# Patient Record
Sex: MALE | Race: WHITE | URBAN - METROPOLITAN AREA
[De-identification: names, ages, dates, MRNs, and addresses within clinical notes are randomized per-mention and may not be internally consistent; named-entity substitution may affect disease eponyms.]

---

## 2017-02-17 ENCOUNTER — HISTORICAL (OUTPATIENT)
Dept: ADMINISTRATIVE | Facility: HOSPITAL | Age: 62
End: 2017-02-17

## 2017-02-21 ENCOUNTER — HISTORICAL (OUTPATIENT)
Dept: ADMINISTRATIVE | Facility: HOSPITAL | Age: 62
End: 2017-02-21

## 2017-04-12 ENCOUNTER — HISTORICAL (OUTPATIENT)
Dept: ADMINISTRATIVE | Facility: HOSPITAL | Age: 62
End: 2017-04-12

## 2017-05-15 ENCOUNTER — HISTORICAL (OUTPATIENT)
Dept: ADMINISTRATIVE | Facility: HOSPITAL | Age: 62
End: 2017-05-15

## 2017-05-15 LAB
ABS NEUT (OLG): 6.95 X10(3)/MCL (ref 2.1–9.2)
BASOPHILS # BLD AUTO: 0.07 X10(3)/MCL
BASOPHILS NFR BLD AUTO: 1 % (ref 0–1)
BUN SERPL-MCNC: 13 MG/DL (ref 7–25)
CALCIUM SERPL-MCNC: 8 MG/DL (ref 8.4–10.3)
CHLORIDE SERPL-SCNC: 111 MMOL/L (ref 96–110)
CO2 SERPL-SCNC: 23 MMOL/L (ref 24–32)
CREAT SERPL-MCNC: 0.74 MG/DL (ref 0.7–1.4)
EOSINOPHIL # BLD AUTO: 0.21 X10(3)/MCL
EOSINOPHIL NFR BLD AUTO: 2 % (ref 0–5)
ERYTHROCYTE [DISTWIDTH] IN BLOOD BY AUTOMATED COUNT: 13.6 % (ref 11.5–14.5)
GLUCOSE SERPL-MCNC: 107 MG/DL (ref 65–99)
HCT VFR BLD AUTO: 36.6 % (ref 40–51)
HGB BLD-MCNC: 12.5 GM/DL (ref 13.5–17.5)
IMM GRANULOCYTES # BLD AUTO: 0.06 10*3/UL
IMM GRANULOCYTES NFR BLD AUTO: 0 %
LYMPHOCYTES # BLD AUTO: 3.14 X10(3)/MCL
LYMPHOCYTES NFR BLD AUTO: 27 % (ref 15–40)
MCH RBC QN AUTO: 33.2 PG (ref 26–34)
MCHC RBC AUTO-ENTMCNC: 34.2 GM/DL (ref 31–37)
MCV RBC AUTO: 97.1 FL (ref 80–100)
MONOCYTES # BLD AUTO: 1.04 X10(3)/MCL
MONOCYTES NFR BLD AUTO: 9 % (ref 4–12)
NEUTROPHILS # BLD AUTO: 6.95 X10(3)/MCL
NEUTROPHILS NFR BLD AUTO: 61 X10(3)/MCL
PLATELET # BLD AUTO: 206 X10(3)/MCL (ref 130–400)
PMV BLD AUTO: 8.8 FL (ref 7.4–10.4)
POTASSIUM SERPL-SCNC: 3.8 MMOL/L (ref 3.6–5.2)
RBC # BLD AUTO: 3.77 X10(6)/MCL (ref 4.5–5.9)
SODIUM SERPL-SCNC: 140 MMOL/L (ref 135–146)
WBC # SPEC AUTO: 11.5 X10(3)/MCL (ref 4.5–11)

## 2017-05-16 LAB
ABS NEUT (OLG): 6.12 X10(3)/MCL (ref 2.1–9.2)
BASOPHILS # BLD AUTO: 0.03 X10(3)/MCL
BASOPHILS NFR BLD AUTO: 0 % (ref 0–1)
BUN SERPL-MCNC: 10 MG/DL (ref 7–25)
CALCIUM SERPL-MCNC: 8.7 MG/DL (ref 8.4–10.3)
CHLORIDE SERPL-SCNC: 105 MMOL/L (ref 96–110)
CO2 SERPL-SCNC: 27 MMOL/L (ref 24–32)
CREAT SERPL-MCNC: 0.84 MG/DL (ref 0.7–1.4)
EOSINOPHIL # BLD AUTO: 0.12 10*3/UL
EOSINOPHIL NFR BLD AUTO: 1 % (ref 0–5)
ERYTHROCYTE [DISTWIDTH] IN BLOOD BY AUTOMATED COUNT: 13.9 % (ref 11.5–14.5)
GLUCOSE SERPL-MCNC: 96 MG/DL (ref 65–99)
HCT VFR BLD AUTO: 35.4 % (ref 40–51)
HGB BLD-MCNC: 11.9 GM/DL (ref 13.5–17.5)
IMM GRANULOCYTES # BLD AUTO: 0.03 10*3/UL
IMM GRANULOCYTES NFR BLD AUTO: 0 %
LYMPHOCYTES # BLD AUTO: 2.25 X10(3)/MCL
LYMPHOCYTES NFR BLD AUTO: 24 % (ref 15–40)
MCH RBC QN AUTO: 33.6 PG (ref 26–34)
MCHC RBC AUTO-ENTMCNC: 33.6 GM/DL (ref 31–37)
MCV RBC AUTO: 100 FL (ref 80–100)
MONOCYTES # BLD AUTO: 0.91 X10(3)/MCL
MONOCYTES NFR BLD AUTO: 10 % (ref 4–12)
NEUTROPHILS # BLD AUTO: 6.12 X10(3)/MCL
NEUTROPHILS NFR BLD AUTO: 65 X10(3)/MCL
PLATELET # BLD AUTO: 191 X10(3)/MCL (ref 130–400)
PMV BLD AUTO: 9.8 FL (ref 7.4–10.4)
POTASSIUM SERPL-SCNC: 4.4 MMOL/L (ref 3.6–5.2)
RBC # BLD AUTO: 3.54 X10(6)/MCL (ref 4.5–5.9)
SODIUM SERPL-SCNC: 141 MMOL/L (ref 135–146)
WBC # SPEC AUTO: 9.5 X10(3)/MCL (ref 4.5–11)

## 2017-05-18 ENCOUNTER — HISTORICAL (OUTPATIENT)
Dept: ADMINISTRATIVE | Facility: HOSPITAL | Age: 62
End: 2017-05-18

## 2017-06-14 ENCOUNTER — HISTORICAL (OUTPATIENT)
Dept: ADMINISTRATIVE | Facility: HOSPITAL | Age: 62
End: 2017-06-14

## 2017-09-20 ENCOUNTER — HISTORICAL (OUTPATIENT)
Dept: ADMINISTRATIVE | Facility: HOSPITAL | Age: 62
End: 2017-09-20

## 2018-03-19 ENCOUNTER — HISTORICAL (OUTPATIENT)
Dept: ADMINISTRATIVE | Facility: HOSPITAL | Age: 63
End: 2018-03-19

## 2018-03-19 LAB
ABS NEUT (OLG): 1.8 X10(3)/MCL (ref 2.1–9.2)
ALBUMIN SERPL-MCNC: 3.6 GM/DL (ref 3.4–5)
ALBUMIN/GLOB SERPL: 1 RATIO (ref 1–2)
ALP SERPL-CCNC: 71 UNIT/L (ref 45–117)
ALT SERPL-CCNC: 23 UNIT/L (ref 12–78)
AST SERPL-CCNC: 28 UNIT/L (ref 15–37)
BASOPHILS # BLD AUTO: 0.06 X10(3)/MCL
BASOPHILS NFR BLD AUTO: 1 %
BILIRUB SERPL-MCNC: 0.5 MG/DL (ref 0.2–1)
BILIRUBIN DIRECT+TOT PNL SERPL-MCNC: 0.2 MG/DL
BILIRUBIN DIRECT+TOT PNL SERPL-MCNC: 0.3 MG/DL
BUN SERPL-MCNC: 9 MG/DL (ref 7–18)
CALCIUM SERPL-MCNC: 9 MG/DL (ref 8.5–10.1)
CHLORIDE SERPL-SCNC: 106 MMOL/L (ref 98–107)
CHOLEST SERPL-MCNC: 142 MG/DL
CHOLEST/HDLC SERPL: 2.3 {RATIO} (ref 0–5)
CO2 SERPL-SCNC: 30 MMOL/L (ref 21–32)
CREAT SERPL-MCNC: 0.8 MG/DL (ref 0.6–1.3)
EOSINOPHIL # BLD AUTO: 0.29 10*3/UL
EOSINOPHIL NFR BLD AUTO: 6 %
ERYTHROCYTE [DISTWIDTH] IN BLOOD BY AUTOMATED COUNT: 13.2 % (ref 11.5–14.5)
GLOBULIN SER-MCNC: 3.8 GM/ML (ref 2.3–3.5)
GLUCOSE SERPL-MCNC: 76 MG/DL (ref 74–106)
HCT VFR BLD AUTO: 43.3 % (ref 40–51)
HDLC SERPL-MCNC: 61 MG/DL
HGB BLD-MCNC: 14.9 GM/DL (ref 13.5–17.5)
IMM GRANULOCYTES # BLD AUTO: 0.02 10*3/UL
IMM GRANULOCYTES NFR BLD AUTO: 0 %
LDLC SERPL CALC-MCNC: 58 MG/DL (ref 0–130)
LYMPHOCYTES # BLD AUTO: 2.02 X10(3)/MCL
LYMPHOCYTES NFR BLD AUTO: 42 % (ref 13–40)
MCH RBC QN AUTO: 34.3 PG (ref 26–34)
MCHC RBC AUTO-ENTMCNC: 34.4 GM/DL (ref 31–37)
MCV RBC AUTO: 99.5 FL (ref 80–100)
MONOCYTES # BLD AUTO: 0.63 X10(3)/MCL
MONOCYTES NFR BLD AUTO: 13 % (ref 4–12)
NEUTROPHILS # BLD AUTO: 1.8 X10(3)/MCL
NEUTROPHILS NFR BLD AUTO: 37 X10(3)/MCL
PLATELET # BLD AUTO: 259 X10(3)/MCL (ref 130–400)
PMV BLD AUTO: 8.9 FL (ref 7.4–10.4)
POTASSIUM SERPL-SCNC: 5 MMOL/L (ref 3.5–5.1)
PROT SERPL-MCNC: 7.4 GM/DL (ref 6.4–8.2)
RBC # BLD AUTO: 4.35 X10(6)/MCL (ref 4.5–5.9)
SODIUM SERPL-SCNC: 142 MMOL/L (ref 136–145)
T4 FREE SERPL-MCNC: 0.88 NG/DL (ref 0.76–1.46)
TRIGL SERPL-MCNC: 114 MG/DL
TSH SERPL-ACNC: 1.07 MIU/L (ref 0.36–3.74)
VLDLC SERPL CALC-MCNC: 23 MG/DL
WBC # SPEC AUTO: 4.8 X10(3)/MCL (ref 4.5–11)

## 2018-04-30 ENCOUNTER — HISTORICAL (OUTPATIENT)
Dept: ADMINISTRATIVE | Facility: HOSPITAL | Age: 63
End: 2018-04-30

## 2018-04-30 LAB
ABS NEUT (OLG): 4.06 X10(3)/MCL (ref 2.1–9.2)
ALBUMIN SERPL-MCNC: 3.8 GM/DL (ref 3.4–5)
ALBUMIN/GLOB SERPL: 1 RATIO (ref 1–2)
ALP SERPL-CCNC: 74 UNIT/L (ref 45–117)
ALT SERPL-CCNC: 20 UNIT/L (ref 12–78)
AST SERPL-CCNC: 29 UNIT/L (ref 15–37)
BASOPHILS # BLD AUTO: 0.04 X10(3)/MCL
BASOPHILS NFR BLD AUTO: 1 %
BILIRUB SERPL-MCNC: 1.1 MG/DL (ref 0.2–1)
BILIRUBIN DIRECT+TOT PNL SERPL-MCNC: 0.4 MG/DL
BILIRUBIN DIRECT+TOT PNL SERPL-MCNC: 0.7 MG/DL
BUN SERPL-MCNC: 11 MG/DL (ref 7–18)
CALCIUM SERPL-MCNC: 8.7 MG/DL (ref 8.5–10.1)
CHLORIDE SERPL-SCNC: 105 MMOL/L (ref 98–107)
CK MB SERPL-MCNC: 1.2 NG/ML (ref 1–3.6)
CK SERPL-CCNC: 112 UNIT/L (ref 39–308)
CO2 SERPL-SCNC: 27 MMOL/L (ref 21–32)
CREAT SERPL-MCNC: 0.8 MG/DL (ref 0.6–1.3)
EOSINOPHIL # BLD AUTO: 0.08 10*3/UL
EOSINOPHIL NFR BLD AUTO: 1 %
ERYTHROCYTE [DISTWIDTH] IN BLOOD BY AUTOMATED COUNT: 13.3 % (ref 11.5–14.5)
GLOBULIN SER-MCNC: 3.8 GM/ML (ref 2.3–3.5)
GLUCOSE SERPL-MCNC: 83 MG/DL (ref 74–106)
HCT VFR BLD AUTO: 44.9 % (ref 40–51)
HGB BLD-MCNC: 15.8 GM/DL (ref 13.5–17.5)
IMM GRANULOCYTES # BLD AUTO: 0.02 10*3/UL
IMM GRANULOCYTES NFR BLD AUTO: 0 %
LYMPHOCYTES # BLD AUTO: 1.59 X10(3)/MCL
LYMPHOCYTES NFR BLD AUTO: 24 % (ref 13–40)
MAGNESIUM SERPL-MCNC: 2.4 MG/DL (ref 1.8–2.4)
MCH RBC QN AUTO: 34.5 PG (ref 26–34)
MCHC RBC AUTO-ENTMCNC: 35.2 GM/DL (ref 31–37)
MCV RBC AUTO: 98 FL (ref 80–100)
MONOCYTES # BLD AUTO: 0.78 X10(3)/MCL
MONOCYTES NFR BLD AUTO: 12 % (ref 4–12)
NEUTROPHILS # BLD AUTO: 4.06 X10(3)/MCL
NEUTROPHILS NFR BLD AUTO: 62 X10(3)/MCL
PLATELET # BLD AUTO: 245 X10(3)/MCL (ref 130–400)
PMV BLD AUTO: 9 FL (ref 7.4–10.4)
POC TROPONIN: 0 NG/ML (ref 0–0.08)
POTASSIUM SERPL-SCNC: 4.4 MMOL/L (ref 3.5–5.1)
PROT SERPL-MCNC: 7.6 GM/DL (ref 6.4–8.2)
RBC # BLD AUTO: 4.58 X10(6)/MCL (ref 4.5–5.9)
SODIUM SERPL-SCNC: 133 MMOL/L (ref 136–145)
WBC # SPEC AUTO: 6.6 X10(3)/MCL (ref 4.5–11)

## 2018-07-31 ENCOUNTER — HISTORICAL (OUTPATIENT)
Dept: ADMINISTRATIVE | Facility: HOSPITAL | Age: 63
End: 2018-07-31

## 2018-08-09 ENCOUNTER — HISTORICAL (OUTPATIENT)
Dept: ADMINISTRATIVE | Facility: HOSPITAL | Age: 63
End: 2018-08-09

## 2018-10-22 ENCOUNTER — HISTORICAL (OUTPATIENT)
Dept: ADMINISTRATIVE | Facility: HOSPITAL | Age: 63
End: 2018-10-22

## 2018-10-22 LAB
ALBUMIN SERPL-MCNC: 3.6 GM/DL (ref 3.4–5)
ALBUMIN/GLOB SERPL: 1 RATIO (ref 1–2)
ALP SERPL-CCNC: 67 UNIT/L (ref 45–117)
ALT SERPL-CCNC: 21 UNIT/L (ref 12–78)
AST SERPL-CCNC: 25 UNIT/L (ref 15–37)
BILIRUB SERPL-MCNC: 0.6 MG/DL (ref 0.2–1)
BILIRUBIN DIRECT+TOT PNL SERPL-MCNC: 0.2 MG/DL
BILIRUBIN DIRECT+TOT PNL SERPL-MCNC: 0.4 MG/DL
BUN SERPL-MCNC: 11 MG/DL (ref 7–18)
CALCIUM SERPL-MCNC: 9.1 MG/DL (ref 8.5–10.1)
CHLORIDE SERPL-SCNC: 105 MMOL/L (ref 98–107)
CHOLEST SERPL-MCNC: 132 MG/DL
CHOLEST/HDLC SERPL: 2.3 {RATIO} (ref 0–5)
CO2 SERPL-SCNC: 32 MMOL/L (ref 21–32)
CREAT SERPL-MCNC: 1 MG/DL (ref 0.6–1.3)
GLOBULIN SER-MCNC: 4 GM/ML (ref 2.3–3.5)
GLUCOSE SERPL-MCNC: 92 MG/DL (ref 74–106)
HDLC SERPL-MCNC: 58 MG/DL
LDLC SERPL CALC-MCNC: 46 MG/DL (ref 0–130)
POTASSIUM SERPL-SCNC: 4.2 MMOL/L (ref 3.5–5.1)
PROT SERPL-MCNC: 7.6 GM/DL (ref 6.4–8.2)
SODIUM SERPL-SCNC: 141 MMOL/L (ref 136–145)
TRIGL SERPL-MCNC: 139 MG/DL
VLDLC SERPL CALC-MCNC: 28 MG/DL

## 2018-11-26 ENCOUNTER — HISTORICAL (OUTPATIENT)
Dept: ADMINISTRATIVE | Facility: HOSPITAL | Age: 63
End: 2018-11-26

## 2019-05-06 ENCOUNTER — HISTORICAL (OUTPATIENT)
Dept: ADMINISTRATIVE | Facility: HOSPITAL | Age: 64
End: 2019-05-06

## 2019-05-06 LAB
ABS NEUT (OLG): 4.24 X10(3)/MCL (ref 2.1–9.2)
ALBUMIN SERPL-MCNC: 3.6 GM/DL (ref 3.4–5)
ALBUMIN/GLOB SERPL: 0.9 RATIO (ref 1.1–2)
ALP SERPL-CCNC: 80 UNIT/L (ref 45–117)
ALT SERPL-CCNC: 17 UNIT/L (ref 12–78)
APPEARANCE, UA: CLEAR
AST SERPL-CCNC: 25 UNIT/L (ref 15–37)
BACTERIA #/AREA URNS AUTO: ABNORMAL /[HPF]
BASOPHILS # BLD AUTO: 0.07 X10(3)/MCL
BASOPHILS NFR BLD AUTO: 1 %
BILIRUB SERPL-MCNC: 0.8 MG/DL (ref 0.2–1)
BILIRUB UR QL STRIP: NEGATIVE
BILIRUBIN DIRECT+TOT PNL SERPL-MCNC: 0.2 MG/DL
BILIRUBIN DIRECT+TOT PNL SERPL-MCNC: 0.6 MG/DL
BUN SERPL-MCNC: 10 MG/DL (ref 7–18)
CALCIUM SERPL-MCNC: 9.2 MG/DL (ref 8.5–10.1)
CHLORIDE SERPL-SCNC: 107 MMOL/L (ref 98–107)
CHOLEST SERPL-MCNC: 142 MG/DL
CHOLEST/HDLC SERPL: 2.7 {RATIO} (ref 0–5)
CO2 SERPL-SCNC: 31 MMOL/L (ref 21–32)
COLOR UR: YELLOW
CREAT SERPL-MCNC: 1 MG/DL (ref 0.6–1.3)
CREAT UR-MCNC: 143 MG/DL
EOSINOPHIL # BLD AUTO: 0.21 X10(3)/MCL
EOSINOPHIL NFR BLD AUTO: 3 %
ERYTHROCYTE [DISTWIDTH] IN BLOOD BY AUTOMATED COUNT: 13.3 % (ref 11.5–14.5)
EST. AVERAGE GLUCOSE BLD GHB EST-MCNC: 108 MG/DL
GLOBULIN SER-MCNC: 3.9 GM/ML (ref 2.3–3.5)
GLUCOSE (UA): NORMAL
GLUCOSE SERPL-MCNC: 82 MG/DL (ref 74–106)
HAV IGM SERPL QL IA: NONREACTIVE
HBA1C MFR BLD: 5.4 % (ref 4.2–6.3)
HBV CORE IGM SERPL QL IA: NONREACTIVE
HBV SURFACE AG SERPL QL IA: NEGATIVE
HCT VFR BLD AUTO: 45.7 % (ref 40–51)
HCV AB SERPL QL IA: NONREACTIVE
HDLC SERPL-MCNC: 53 MG/DL
HGB BLD-MCNC: 15.5 GM/DL (ref 13.5–17.5)
HGB UR QL STRIP: NEGATIVE
HIV 1+2 AB+HIV1 P24 AG SERPL QL IA: NONREACTIVE
HYALINE CASTS #/AREA URNS LPF: ABNORMAL /[LPF]
IMM GRANULOCYTES # BLD AUTO: 0.03 10*3/UL
IMM GRANULOCYTES NFR BLD AUTO: 0 %
KETONES UR QL STRIP: NEGATIVE
LDLC SERPL CALC-MCNC: 52 MG/DL (ref 0–130)
LEUKOCYTE ESTERASE UR QL STRIP: NEGATIVE
LYMPHOCYTES # BLD AUTO: 2.63 X10(3)/MCL
LYMPHOCYTES NFR BLD AUTO: 32 % (ref 13–40)
MCH RBC QN AUTO: 33.7 PG (ref 26–34)
MCHC RBC AUTO-ENTMCNC: 33.9 GM/DL (ref 31–37)
MCV RBC AUTO: 99.3 FL (ref 80–100)
MICROALBUMIN UR-MCNC: 11.9 MG/L (ref 0–19)
MICROALBUMIN/CREAT RATIO PNL UR: 8.3 MCG/MG CR (ref 0–29)
MONOCYTES # BLD AUTO: 1.03 X10(3)/MCL
MONOCYTES NFR BLD AUTO: 12 % (ref 4–12)
NEUTROPHILS # BLD AUTO: 4.24 X10(3)/MCL
NEUTROPHILS NFR BLD AUTO: 52 X10(3)/MCL
NITRITE UR QL STRIP: NEGATIVE
PH UR STRIP: 7 [PH] (ref 4.5–8)
PLATELET # BLD AUTO: 270 X10(3)/MCL (ref 130–400)
PMV BLD AUTO: 9.2 FL (ref 7.4–10.4)
POTASSIUM SERPL-SCNC: 4.6 MMOL/L (ref 3.5–5.1)
PROT SERPL-MCNC: 7.5 GM/DL (ref 6.4–8.2)
PROT UR QL STRIP: NEGATIVE
PSA SERPL-MCNC: 0.3 NG/ML
RBC # BLD AUTO: 4.6 X10(6)/MCL (ref 4.5–5.9)
RBC #/AREA URNS AUTO: ABNORMAL /[HPF]
SODIUM SERPL-SCNC: 141 MMOL/L (ref 136–145)
SP GR UR STRIP: 1.01 (ref 1–1.03)
SQUAMOUS #/AREA URNS LPF: ABNORMAL /[LPF]
TRIGL SERPL-MCNC: 186 MG/DL
TSH SERPL-ACNC: 1.95 MIU/L (ref 0.36–3.74)
UROBILINOGEN UR STRIP-ACNC: NORMAL
VLDLC SERPL CALC-MCNC: 37 MG/DL
WBC # SPEC AUTO: 8.2 X10(3)/MCL (ref 4.5–11)
WBC #/AREA URNS AUTO: ABNORMAL /HPF

## 2019-06-21 ENCOUNTER — HISTORICAL (OUTPATIENT)
Dept: ADMINISTRATIVE | Facility: HOSPITAL | Age: 64
End: 2019-06-21

## 2019-06-21 LAB
BUN SERPL-MCNC: 8 MG/DL (ref 7–18)
CALCIUM SERPL-MCNC: 9.4 MG/DL (ref 8.5–10.1)
CHLORIDE SERPL-SCNC: 107 MMOL/L (ref 98–107)
CO2 SERPL-SCNC: 29 MMOL/L (ref 21–32)
CREAT SERPL-MCNC: 0.8 MG/DL (ref 0.6–1.3)
CREAT/UREA NIT SERPL: 10
ERYTHROCYTE [DISTWIDTH] IN BLOOD BY AUTOMATED COUNT: 13.3 % (ref 11.5–14.5)
GLUCOSE SERPL-MCNC: 73 MG/DL (ref 74–106)
HCT VFR BLD AUTO: 44.3 % (ref 40–51)
HGB BLD-MCNC: 14.9 GM/DL (ref 13.5–17.5)
MCH RBC QN AUTO: 33.7 PG (ref 26–34)
MCHC RBC AUTO-ENTMCNC: 33.6 GM/DL (ref 31–37)
MCV RBC AUTO: 100.2 FL (ref 80–100)
PLATELET # BLD AUTO: 253 X10(3)/MCL (ref 130–400)
PMV BLD AUTO: 9.6 FL (ref 7.4–10.4)
POTASSIUM SERPL-SCNC: 4.4 MMOL/L (ref 3.5–5.1)
RBC # BLD AUTO: 4.42 X10(6)/MCL (ref 4.5–5.9)
SODIUM SERPL-SCNC: 139 MMOL/L (ref 136–145)
WBC # SPEC AUTO: 6.8 X10(3)/MCL (ref 4.5–11)

## 2019-06-28 ENCOUNTER — HISTORICAL (OUTPATIENT)
Dept: ADMINISTRATIVE | Facility: HOSPITAL | Age: 64
End: 2019-06-28

## 2019-06-28 LAB — ETHANOL SERPL-MCNC: 3 MG/DL

## 2019-09-13 ENCOUNTER — HISTORICAL (OUTPATIENT)
Dept: ADMINISTRATIVE | Facility: HOSPITAL | Age: 64
End: 2019-09-13

## 2019-09-13 LAB
ABS NEUT (OLG): 7.39 X10(3)/MCL (ref 2.1–9.2)
ALBUMIN SERPL-MCNC: 3.7 GM/DL (ref 3.4–5)
ALBUMIN/GLOB SERPL: 1 RATIO (ref 1.1–2)
ALP SERPL-CCNC: 80 UNIT/L (ref 45–117)
ALT SERPL-CCNC: 14 UNIT/L (ref 12–78)
APTT PPP: 35.1 SECOND(S) (ref 23.3–37)
AST SERPL-CCNC: 19 UNIT/L (ref 15–37)
BASOPHILS # BLD AUTO: 0 X10(3)/MCL (ref 0–0.2)
BASOPHILS NFR BLD AUTO: 0 %
BILIRUB SERPL-MCNC: 1.1 MG/DL (ref 0.2–1)
BILIRUBIN DIRECT+TOT PNL SERPL-MCNC: 0.4 MG/DL (ref 0–0.2)
BILIRUBIN DIRECT+TOT PNL SERPL-MCNC: 0.7 MG/DL
BUN SERPL-MCNC: 13 MG/DL (ref 7–18)
CALCIUM SERPL-MCNC: 9.2 MG/DL (ref 8.5–10.1)
CHLORIDE SERPL-SCNC: 105 MMOL/L (ref 98–107)
CO2 SERPL-SCNC: 28 MMOL/L (ref 21–32)
CREAT SERPL-MCNC: 0.9 MG/DL (ref 0.6–1.3)
EOSINOPHIL # BLD AUTO: 0 X10(3)/MCL (ref 0–0.9)
EOSINOPHIL NFR BLD AUTO: 0 %
ERYTHROCYTE [DISTWIDTH] IN BLOOD BY AUTOMATED COUNT: 14.3 % (ref 11.5–14.5)
GLOBULIN SER-MCNC: 3.7 GM/ML (ref 2.3–3.5)
GLUCOSE SERPL-MCNC: 124 MG/DL (ref 74–106)
HCT VFR BLD AUTO: 43.7 % (ref 40–51)
HGB BLD-MCNC: 14.2 GM/DL (ref 13.5–17.5)
IMM GRANULOCYTES # BLD AUTO: 0.04 10*3/UL
IMM GRANULOCYTES NFR BLD AUTO: 0 %
INR PPP: 2.49 (ref 0.9–1.2)
LYMPHOCYTES # BLD AUTO: 1.5 X10(3)/MCL (ref 0.6–4.6)
LYMPHOCYTES NFR BLD AUTO: 16 %
MCH RBC QN AUTO: 33.1 PG (ref 26–34)
MCHC RBC AUTO-ENTMCNC: 32.5 GM/DL (ref 31–37)
MCV RBC AUTO: 101.9 FL (ref 80–100)
MONOCYTES # BLD AUTO: 0.8 X10(3)/MCL (ref 0.1–1.3)
MONOCYTES NFR BLD AUTO: 8 %
NEUTROPHILS # BLD AUTO: 7.39 X10(3)/MCL (ref 2.1–9.2)
NEUTROPHILS NFR BLD AUTO: 75 %
PLATELET # BLD AUTO: 273 X10(3)/MCL (ref 130–400)
PMV BLD AUTO: 8.8 FL (ref 7.4–10.4)
POTASSIUM SERPL-SCNC: 4.5 MMOL/L (ref 3.5–5.1)
PROT SERPL-MCNC: 7.4 GM/DL (ref 6.4–8.2)
PROTHROMBIN TIME: 27 SECOND(S) (ref 11.9–14.4)
RBC # BLD AUTO: 4.29 X10(6)/MCL (ref 4.5–5.9)
SODIUM SERPL-SCNC: 139 MMOL/L (ref 136–145)
TROPONIN I SERPL-MCNC: <0.015 NG/ML (ref 0–0.05)
WBC # SPEC AUTO: 9.8 X10(3)/MCL (ref 4.5–11)

## 2019-11-01 ENCOUNTER — HISTORICAL (OUTPATIENT)
Dept: ADMINISTRATIVE | Facility: HOSPITAL | Age: 64
End: 2019-11-01

## 2019-11-01 LAB
CHOLEST SERPL-MCNC: 139 MG/DL
CHOLEST/HDLC SERPL: 2.4 {RATIO} (ref 0–5)
DEPRECATED CALCIDIOL+CALCIFEROL SERPL-MC: 31.7 NG/ML (ref 30–80)
HDLC SERPL-MCNC: 57 MG/DL (ref 40–59)
LDLC SERPL CALC-MCNC: 57 MG/DL
TRIGL SERPL-MCNC: 124 MG/DL
VLDLC SERPL CALC-MCNC: 25 MG/DL

## 2020-05-04 ENCOUNTER — HISTORICAL (OUTPATIENT)
Dept: ADMINISTRATIVE | Facility: HOSPITAL | Age: 65
End: 2020-05-04

## 2020-05-04 LAB
ABS NEUT (OLG): 3.88 X10(3)/MCL (ref 2.1–9.2)
ALBUMIN SERPL-MCNC: 3.7 GM/DL (ref 3.4–5)
ALBUMIN/GLOB SERPL: 0.9 RATIO (ref 1.1–2)
ALP SERPL-CCNC: 87 UNIT/L (ref 45–117)
ALT SERPL-CCNC: 16 UNIT/L (ref 12–78)
AST SERPL-CCNC: 26 UNIT/L (ref 15–37)
BASOPHILS # BLD AUTO: 0.1 X10(3)/MCL (ref 0–0.2)
BASOPHILS NFR BLD AUTO: 1 %
BILIRUB SERPL-MCNC: 0.8 MG/DL (ref 0.2–1)
BILIRUBIN DIRECT+TOT PNL SERPL-MCNC: 0.2 MG/DL (ref 0–0.2)
BILIRUBIN DIRECT+TOT PNL SERPL-MCNC: 0.6 MG/DL
BUN SERPL-MCNC: 8 MG/DL (ref 7–18)
CALCIUM SERPL-MCNC: 9.2 MG/DL (ref 8.5–10.1)
CHLORIDE SERPL-SCNC: 110 MMOL/L (ref 98–107)
CHOLEST SERPL-MCNC: 139 MG/DL
CHOLEST/HDLC SERPL: 2.3 {RATIO} (ref 0–5)
CO2 SERPL-SCNC: 25 MMOL/L (ref 21–32)
CREAT SERPL-MCNC: 0.8 MG/DL (ref 0.6–1.3)
EOSINOPHIL # BLD AUTO: 0.1 X10(3)/MCL (ref 0–0.9)
EOSINOPHIL NFR BLD AUTO: 2 %
ERYTHROCYTE [DISTWIDTH] IN BLOOD BY AUTOMATED COUNT: 17.4 % (ref 11.5–14.5)
EST. AVERAGE GLUCOSE BLD GHB EST-MCNC: 148 MG/DL
GLOBULIN SER-MCNC: 4.2 GM/ML (ref 2.3–3.5)
GLUCOSE SERPL-MCNC: 79 MG/DL (ref 74–106)
HBA1C MFR BLD: 6.8 % (ref 4.2–6.3)
HCT VFR BLD AUTO: 35.7 % (ref 40–51)
HDLC SERPL-MCNC: 60 MG/DL (ref 40–59)
HGB BLD-MCNC: 11.3 GM/DL (ref 13.5–17.5)
IMM GRANULOCYTES # BLD AUTO: 0.03 10*3/UL
IMM GRANULOCYTES NFR BLD AUTO: 0 %
LDLC SERPL CALC-MCNC: 59 MG/DL
LYMPHOCYTES # BLD AUTO: 1.8 X10(3)/MCL (ref 0.6–4.6)
LYMPHOCYTES NFR BLD AUTO: 27 %
MCH RBC QN AUTO: 28.3 PG (ref 26–34)
MCHC RBC AUTO-ENTMCNC: 31.7 GM/DL (ref 31–37)
MCV RBC AUTO: 89.5 FL (ref 80–100)
MONOCYTES # BLD AUTO: 0.8 X10(3)/MCL (ref 0.1–1.3)
MONOCYTES NFR BLD AUTO: 12 %
NEUTROPHILS # BLD AUTO: 3.88 X10(3)/MCL (ref 2.1–9.2)
NEUTROPHILS NFR BLD AUTO: 58 %
PLATELET # BLD AUTO: 343 X10(3)/MCL (ref 130–400)
PMV BLD AUTO: 8.7 FL (ref 7.4–10.4)
POTASSIUM SERPL-SCNC: 4.8 MMOL/L (ref 3.5–5.1)
PROT SERPL-MCNC: 7.9 GM/DL (ref 6.4–8.2)
RBC # BLD AUTO: 3.99 X10(6)/MCL (ref 4.5–5.9)
SODIUM SERPL-SCNC: 141 MMOL/L (ref 136–145)
TRIGL SERPL-MCNC: 101 MG/DL
TSH SERPL-ACNC: 0.75 MIU/L (ref 0.36–3.74)
VLDLC SERPL CALC-MCNC: 20 MG/DL
WBC # SPEC AUTO: 6.7 X10(3)/MCL (ref 4.5–11)

## 2020-08-10 ENCOUNTER — HISTORICAL (OUTPATIENT)
Dept: ADMINISTRATIVE | Facility: HOSPITAL | Age: 65
End: 2020-08-10

## 2020-08-10 LAB
ABS NEUT (OLG): 3.57 X10(3)/MCL (ref 2.1–9.2)
ALBUMIN SERPL-MCNC: 3.4 GM/DL (ref 3.4–5)
ALBUMIN/GLOB SERPL: 0.9 RATIO (ref 1.1–2)
ALP SERPL-CCNC: 74 UNIT/L (ref 45–117)
ALT SERPL-CCNC: 14 UNIT/L (ref 12–78)
APTT PPP: 34.3 SECOND(S) (ref 23.3–37)
AST SERPL-CCNC: 16 UNIT/L (ref 15–37)
BASOPHILS # BLD AUTO: 0 X10(3)/MCL (ref 0–0.2)
BASOPHILS NFR BLD AUTO: 1 %
BILIRUB SERPL-MCNC: 0.6 MG/DL (ref 0.2–1)
BILIRUBIN DIRECT+TOT PNL SERPL-MCNC: 0.2 MG/DL (ref 0–0.2)
BILIRUBIN DIRECT+TOT PNL SERPL-MCNC: 0.4 MG/DL
BUN SERPL-MCNC: 8 MG/DL (ref 7–18)
CALCIUM SERPL-MCNC: 8.8 MG/DL (ref 8.5–10.1)
CHLORIDE SERPL-SCNC: 110 MMOL/L (ref 98–107)
CO2 SERPL-SCNC: 26 MMOL/L (ref 21–32)
CREAT SERPL-MCNC: 0.9 MG/DL (ref 0.6–1.3)
CROSSMATCH INTERPRETATION: NORMAL
EOSINOPHIL # BLD AUTO: 0.1 X10(3)/MCL (ref 0–0.9)
EOSINOPHIL NFR BLD AUTO: 2 %
ERYTHROCYTE [DISTWIDTH] IN BLOOD BY AUTOMATED COUNT: 17.2 % (ref 11.5–14.5)
FERRITIN SERPL-MCNC: 4.8 NG/ML (ref 26–388)
GLOBULIN SER-MCNC: 3.9 GM/ML (ref 2.3–3.5)
GLUCOSE SERPL-MCNC: 102 MG/DL (ref 74–106)
HCT VFR BLD AUTO: 25.7 % (ref 40–51)
HGB BLD-MCNC: 7.4 GM/DL (ref 13.5–17.5)
IMM GRANULOCYTES # BLD AUTO: 0.03 10*3/UL
IMM GRANULOCYTES NFR BLD AUTO: 0 %
INR PPP: 1.87 (ref 0.9–1.2)
IRON SATN MFR SERPL: 3.2 % (ref 15–50)
IRON SERPL-MCNC: 14 MCG/DL (ref 65–175)
LYMPHOCYTES # BLD AUTO: 2.1 X10(3)/MCL (ref 0.6–4.6)
LYMPHOCYTES NFR BLD AUTO: 32 %
MCH RBC QN AUTO: 24.3 PG (ref 26–34)
MCHC RBC AUTO-ENTMCNC: 28.8 GM/DL (ref 31–37)
MCV RBC AUTO: 84.3 FL (ref 80–100)
MONOCYTES # BLD AUTO: 0.8 X10(3)/MCL (ref 0.1–1.3)
MONOCYTES NFR BLD AUTO: 13 %
NEUTROPHILS # BLD AUTO: 3.57 X10(3)/MCL (ref 2.1–9.2)
NEUTROPHILS NFR BLD AUTO: 53 %
PLATELET # BLD AUTO: 310 X10(3)/MCL (ref 130–400)
PMV BLD AUTO: 8.8 FL (ref 7.4–10.4)
POTASSIUM SERPL-SCNC: 4.5 MMOL/L (ref 3.5–5.1)
PRODUCT READY: NORMAL
PROT SERPL-MCNC: 7.3 GM/DL (ref 6.4–8.2)
PROTHROMBIN TIME: 21 SECOND(S) (ref 11.9–14.4)
RBC # BLD AUTO: 3.05 X10(6)/MCL (ref 4.5–5.9)
RET# (OHS): 0.07 X10(6)/MCL (ref 0.02–0.09)
RETICULOCYTE COUNT AUTOMATED (OLG): 2.2 % (ref 0.5–1.5)
SARS-COV-2 AG RESP QL IA.RAPID: NEGATIVE
SODIUM SERPL-SCNC: 140 MMOL/L (ref 136–145)
TIBC SERPL-MCNC: 431 MCG/DL (ref 250–450)
TRANSFERRIN SERPL-MCNC: 327 MG/DL (ref 200–360)
TSH SERPL-ACNC: 0.78 MIU/L (ref 0.36–3.74)
WBC # SPEC AUTO: 6.7 X10(3)/MCL (ref 4.5–11)

## 2020-08-11 LAB
ABS NEUT (OLG): 3.24 X10(3)/MCL (ref 2.1–9.2)
ALBUMIN SERPL-MCNC: 3 GM/DL (ref 3.4–5)
ALBUMIN/GLOB SERPL: 0.9 RATIO (ref 1.1–2)
ALP SERPL-CCNC: 60 UNIT/L (ref 45–117)
ALT SERPL-CCNC: 13 UNIT/L (ref 12–78)
AMMONIA PLAS-MSCNC: 61 MMOL/L (ref 11–32)
APPEARANCE, UA: CLEAR
AST SERPL-CCNC: 13 UNIT/L (ref 15–37)
BACTERIA #/AREA URNS AUTO: ABNORMAL /HPF
BASOPHILS # BLD AUTO: 0 X10(3)/MCL (ref 0–0.2)
BASOPHILS NFR BLD AUTO: 1 %
BILIRUB SERPL-MCNC: 0.3 MG/DL (ref 0.2–1)
BILIRUB UR QL STRIP: NEGATIVE
BILIRUBIN DIRECT+TOT PNL SERPL-MCNC: 0.1 MG/DL (ref 0–0.2)
BILIRUBIN DIRECT+TOT PNL SERPL-MCNC: 0.2 MG/DL
BUN SERPL-MCNC: 11 MG/DL (ref 7–18)
CALCIUM SERPL-MCNC: 8.3 MG/DL (ref 8.5–10.1)
CHLORIDE SERPL-SCNC: 112 MMOL/L (ref 98–107)
CO2 SERPL-SCNC: 26 MMOL/L (ref 21–32)
COLOR UR: YELLOW
CREAT SERPL-MCNC: 0.8 MG/DL (ref 0.6–1.3)
EOSINOPHIL # BLD AUTO: 0.2 X10(3)/MCL (ref 0–0.9)
EOSINOPHIL NFR BLD AUTO: 2 %
ERYTHROCYTE [DISTWIDTH] IN BLOOD BY AUTOMATED COUNT: 17.3 % (ref 11.5–14.5)
GLOBULIN SER-MCNC: 3.2 GM/ML (ref 2.3–3.5)
GLUCOSE (UA): NEGATIVE
GLUCOSE SERPL-MCNC: 90 MG/DL (ref 74–106)
HCT VFR BLD AUTO: 23.2 % (ref 40–51)
HCT VFR BLD AUTO: 28.1 % (ref 40–51)
HCT VFR BLD AUTO: 28.7 % (ref 40–51)
HGB BLD-MCNC: 6.7 GM/DL (ref 13.5–17.5)
HGB BLD-MCNC: 8.5 GM/DL (ref 13.5–17.5)
HGB BLD-MCNC: 8.6 GM/DL (ref 13.5–17.5)
HGB UR QL STRIP: NEGATIVE
HYALINE CASTS #/AREA URNS LPF: ABNORMAL /LPF
IMM GRANULOCYTES # BLD AUTO: 0.01 10*3/UL
IMM GRANULOCYTES NFR BLD AUTO: 0 %
KETONES UR QL STRIP: NEGATIVE
LEUKOCYTE ESTERASE UR QL STRIP: NEGATIVE
LYMPHOCYTES # BLD AUTO: 2.1 X10(3)/MCL (ref 0.6–4.6)
LYMPHOCYTES NFR BLD AUTO: 33 %
MCH RBC QN AUTO: 24.5 PG (ref 26–34)
MCHC RBC AUTO-ENTMCNC: 28.9 GM/DL (ref 31–37)
MCV RBC AUTO: 85 FL (ref 80–100)
MONOCYTES # BLD AUTO: 0.8 X10(3)/MCL (ref 0.1–1.3)
MONOCYTES NFR BLD AUTO: 13 %
NEUTROPHILS # BLD AUTO: 3.24 X10(3)/MCL (ref 2.1–9.2)
NEUTROPHILS NFR BLD AUTO: 50 %
NITRITE UR QL STRIP: NEGATIVE
PH UR STRIP: 6.5 [PH] (ref 4.5–8)
PLATELET # BLD AUTO: 262 X10(3)/MCL (ref 130–400)
PMV BLD AUTO: 9.5 FL (ref 7.4–10.4)
POTASSIUM SERPL-SCNC: 4.1 MMOL/L (ref 3.5–5.1)
PROT SERPL-MCNC: 6.2 GM/DL (ref 6.4–8.2)
PROT UR QL STRIP: NEGATIVE
RBC # BLD AUTO: 2.73 X10(6)/MCL (ref 4.5–5.9)
RBC #/AREA URNS AUTO: ABNORMAL /HPF
SODIUM SERPL-SCNC: 142 MMOL/L (ref 136–145)
SP GR UR STRIP: 1.02 (ref 1–1.03)
SQUAMOUS #/AREA URNS LPF: ABNORMAL /LPF
UROBILINOGEN UR STRIP-ACNC: 2 MG/DL
WBC # SPEC AUTO: 6.4 X10(3)/MCL (ref 4.5–11)
WBC #/AREA URNS AUTO: ABNORMAL /HPF

## 2020-08-12 LAB
ABS NEUT (OLG): 3.87 X10(3)/MCL (ref 2.1–9.2)
ALBUMIN SERPL-MCNC: 3.1 GM/DL (ref 3.4–5)
ALBUMIN/GLOB SERPL: 0.9 RATIO (ref 1.1–2)
ALP SERPL-CCNC: 61 UNIT/L (ref 45–117)
ALT SERPL-CCNC: 11 UNIT/L (ref 12–78)
AST SERPL-CCNC: 15 UNIT/L (ref 15–37)
BASOPHILS NFR BLD MANUAL: 0 %
BILIRUB SERPL-MCNC: 0.6 MG/DL (ref 0.2–1)
BILIRUBIN DIRECT+TOT PNL SERPL-MCNC: 0.2 MG/DL (ref 0–0.2)
BILIRUBIN DIRECT+TOT PNL SERPL-MCNC: 0.4 MG/DL
BUN SERPL-MCNC: 7 MG/DL (ref 7–18)
CALCIUM SERPL-MCNC: 8.6 MG/DL (ref 8.5–10.1)
CHLORIDE SERPL-SCNC: 116 MMOL/L (ref 98–107)
CO2 SERPL-SCNC: 25 MMOL/L (ref 21–32)
CREAT SERPL-MCNC: 0.8 MG/DL (ref 0.6–1.3)
EOSINOPHIL NFR BLD MANUAL: 2 %
ERYTHROCYTE [DISTWIDTH] IN BLOOD BY AUTOMATED COUNT: 17.3 % (ref 11.5–14.5)
GLOBULIN SER-MCNC: 3.5 GM/ML (ref 2.3–3.5)
GLUCOSE SERPL-MCNC: 114 MG/DL (ref 74–106)
GRANULOCYTES NFR BLD MANUAL: 64 % (ref 43–75)
HCT VFR BLD AUTO: 29.4 % (ref 40–51)
HGB BLD-MCNC: 8.8 GM/DL (ref 13.5–17.5)
LYMPHOCYTES NFR BLD MANUAL: 17 % (ref 20.5–51.1)
LYMPHOCYTES NFR BLD MANUAL: 4 %
MAGNESIUM SERPL-MCNC: 2.3 MG/DL (ref 1.6–2.6)
MCH RBC QN AUTO: 25.2 PG (ref 26–34)
MCHC RBC AUTO-ENTMCNC: 29.9 GM/DL (ref 31–37)
MCV RBC AUTO: 84.2 FL (ref 80–100)
MONOCYTES NFR BLD MANUAL: 8 % (ref 2–9)
NEUTS BAND NFR BLD MANUAL: 5 % (ref 0–10)
PHOSPHATE SERPL-MCNC: 3 MG/DL (ref 2.5–4.9)
PLATELET # BLD AUTO: 272 X10(3)/MCL (ref 130–400)
PLATELET # BLD EST: NORMAL 10*3/UL
PMV BLD AUTO: 9.5 FL (ref 7.4–10.4)
POTASSIUM SERPL-SCNC: 4 MMOL/L (ref 3.5–5.1)
PROT SERPL-MCNC: 6.6 GM/DL (ref 6.4–8.2)
RBC # BLD AUTO: 3.49 X10(6)/MCL (ref 4.5–5.9)
RBC MORPH BLD: NORMAL
SODIUM SERPL-SCNC: 145 MMOL/L (ref 136–145)
WBC # SPEC AUTO: 6.8 X10(3)/MCL (ref 4.5–11)

## 2020-08-13 LAB
ABS NEUT (OLG): 7.15 X10(3)/MCL (ref 2.1–9.2)
ALBUMIN SERPL-MCNC: 2.8 GM/DL (ref 3.4–5)
ALBUMIN/GLOB SERPL: 0.8 RATIO (ref 1.1–2)
ALP SERPL-CCNC: 58 UNIT/L (ref 45–117)
ALT SERPL-CCNC: 11 UNIT/L (ref 12–78)
AST SERPL-CCNC: 14 UNIT/L (ref 15–37)
BASOPHILS # BLD AUTO: 0 X10(3)/MCL (ref 0–0.2)
BASOPHILS NFR BLD AUTO: 0 %
BILIRUB SERPL-MCNC: 0.6 MG/DL (ref 0.2–1)
BILIRUBIN DIRECT+TOT PNL SERPL-MCNC: 0.2 MG/DL (ref 0–0.2)
BILIRUBIN DIRECT+TOT PNL SERPL-MCNC: 0.4 MG/DL
BUN SERPL-MCNC: 8 MG/DL (ref 7–18)
CALCIUM SERPL-MCNC: 8.4 MG/DL (ref 8.5–10.1)
CHLORIDE SERPL-SCNC: 111 MMOL/L (ref 98–107)
CO2 SERPL-SCNC: 25 MMOL/L (ref 21–32)
CREAT SERPL-MCNC: 0.8 MG/DL (ref 0.6–1.3)
EOSINOPHIL # BLD AUTO: 0.1 X10(3)/MCL (ref 0–0.9)
EOSINOPHIL NFR BLD AUTO: 1 %
ERYTHROCYTE [DISTWIDTH] IN BLOOD BY AUTOMATED COUNT: 17 % (ref 11.5–14.5)
GLOBULIN SER-MCNC: 3.3 GM/ML (ref 2.3–3.5)
GLUCOSE SERPL-MCNC: 100 MG/DL (ref 74–106)
HCT VFR BLD AUTO: 27.5 % (ref 40–51)
HGB BLD-MCNC: 8.2 GM/DL (ref 13.5–17.5)
IMM GRANULOCYTES # BLD AUTO: 0.03 10*3/UL
IMM GRANULOCYTES NFR BLD AUTO: 0 %
LYMPHOCYTES # BLD AUTO: 1.6 X10(3)/MCL (ref 0.6–4.6)
LYMPHOCYTES NFR BLD AUTO: 17 %
MAGNESIUM SERPL-MCNC: 2 MG/DL (ref 1.6–2.6)
MCH RBC QN AUTO: 25 PG (ref 26–34)
MCHC RBC AUTO-ENTMCNC: 29.8 GM/DL (ref 31–37)
MCV RBC AUTO: 83.8 FL (ref 80–100)
MONOCYTES # BLD AUTO: 0.9 X10(3)/MCL (ref 0.1–1.3)
MONOCYTES NFR BLD AUTO: 9 %
NEUTROPHILS # BLD AUTO: 7.15 X10(3)/MCL (ref 2.1–9.2)
NEUTROPHILS NFR BLD AUTO: 72 %
PHOSPHATE SERPL-MCNC: 3.1 MG/DL (ref 2.5–4.9)
PLATELET # BLD AUTO: 243 X10(3)/MCL (ref 130–400)
PMV BLD AUTO: 9.8 FL (ref 7.4–10.4)
POTASSIUM SERPL-SCNC: 3.6 MMOL/L (ref 3.5–5.1)
PROT SERPL-MCNC: 6.1 GM/DL (ref 6.4–8.2)
RBC # BLD AUTO: 3.28 X10(6)/MCL (ref 4.5–5.9)
SODIUM SERPL-SCNC: 140 MMOL/L (ref 136–145)
WBC # SPEC AUTO: 9.9 X10(3)/MCL (ref 4.5–11)

## 2020-08-16 LAB — FINAL CULTURE: NORMAL

## 2020-09-01 ENCOUNTER — HISTORICAL (OUTPATIENT)
Dept: ADMINISTRATIVE | Facility: HOSPITAL | Age: 65
End: 2020-09-01

## 2020-09-01 LAB
ABS NEUT (OLG): 2.4 X10(3)/MCL (ref 2.1–9.2)
BASOPHILS # BLD AUTO: 0.1 X10(3)/MCL (ref 0–0.2)
BASOPHILS NFR BLD AUTO: 1 %
EOSINOPHIL # BLD AUTO: 0.2 X10(3)/MCL (ref 0–0.9)
EOSINOPHIL NFR BLD AUTO: 3 %
ERYTHROCYTE [DISTWIDTH] IN BLOOD BY AUTOMATED COUNT: 20 % (ref 11.5–14.5)
HCT VFR BLD AUTO: 37.8 % (ref 40–51)
HGB BLD-MCNC: 11.1 GM/DL (ref 13.5–17.5)
IMM GRANULOCYTES # BLD AUTO: 0.02 10*3/UL
IMM GRANULOCYTES NFR BLD AUTO: 0 %
LYMPHOCYTES # BLD AUTO: 2.6 X10(3)/MCL (ref 0.6–4.6)
LYMPHOCYTES NFR BLD AUTO: 41 %
MCH RBC QN AUTO: 25.8 PG (ref 26–34)
MCHC RBC AUTO-ENTMCNC: 29.4 GM/DL (ref 31–37)
MCV RBC AUTO: 87.7 FL (ref 80–100)
MONOCYTES # BLD AUTO: 1 X10(3)/MCL (ref 0.1–1.3)
MONOCYTES NFR BLD AUTO: 17 %
NEUTROPHILS # BLD AUTO: 2.4 X10(3)/MCL (ref 2.1–9.2)
NEUTROPHILS NFR BLD AUTO: 38 %
PLATELET # BLD AUTO: 386 X10(3)/MCL (ref 130–400)
PMV BLD AUTO: 8.5 FL (ref 7.4–10.4)
RBC # BLD AUTO: 4.31 X10(6)/MCL (ref 4.5–5.9)
WBC # SPEC AUTO: 6.3 X10(3)/MCL (ref 4.5–11)

## 2020-10-26 ENCOUNTER — HISTORICAL (OUTPATIENT)
Dept: ADMINISTRATIVE | Facility: HOSPITAL | Age: 65
End: 2020-10-26

## 2020-10-26 LAB
ERYTHROCYTE [DISTWIDTH] IN BLOOD BY AUTOMATED COUNT: 20.9 % (ref 11.5–14.5)
EST. AVERAGE GLUCOSE BLD GHB EST-MCNC: 91.1 MG/DL
HBA1C MFR BLD: 4.8 %
HCT VFR BLD AUTO: 45.2 % (ref 40–51)
HGB BLD-MCNC: 14.5 GM/DL (ref 13.5–17.5)
MCH RBC QN AUTO: 29.8 PG (ref 26–34)
MCHC RBC AUTO-ENTMCNC: 32.1 GM/DL (ref 31–37)
MCV RBC AUTO: 92.8 FL (ref 80–100)
PLATELET # BLD AUTO: 235 X10(3)/MCL (ref 130–400)
PMV BLD AUTO: 8.5 FL (ref 7.4–10.4)
RBC # BLD AUTO: 4.87 X10(6)/MCL (ref 4.5–5.9)
WBC # SPEC AUTO: 5.2 X10(3)/MCL (ref 4.5–11)

## 2021-01-04 ENCOUNTER — HISTORICAL (OUTPATIENT)
Dept: ADMINISTRATIVE | Facility: HOSPITAL | Age: 66
End: 2021-01-04

## 2021-01-04 LAB
ALBUMIN SERPL-MCNC: 4.1 GM/DL (ref 3.4–4.8)
ALBUMIN/GLOB SERPL: 1.2 RATIO (ref 1.1–2)
ALP SERPL-CCNC: 60 UNIT/L (ref 40–150)
ALT SERPL-CCNC: 19 UNIT/L (ref 0–55)
AST SERPL-CCNC: 30 UNIT/L (ref 5–34)
BILIRUB SERPL-MCNC: 1 MG/DL
BILIRUBIN DIRECT+TOT PNL SERPL-MCNC: 0.4 MG/DL (ref 0–0.5)
BILIRUBIN DIRECT+TOT PNL SERPL-MCNC: 0.6 MG/DL (ref 0–0.8)
BUN SERPL-MCNC: 13 MG/DL (ref 8.4–25.7)
CALCIUM SERPL-MCNC: 9.5 MG/DL (ref 8.8–10)
CHLORIDE SERPL-SCNC: 105 MMOL/L (ref 98–107)
CHOLEST SERPL-MCNC: 150 MG/DL
CHOLEST/HDLC SERPL: 3 {RATIO} (ref 0–5)
CO2 SERPL-SCNC: 30 MMOL/L (ref 23–31)
CREAT SERPL-MCNC: 0.94 MG/DL (ref 0.73–1.18)
GLOBULIN SER-MCNC: 3.3 GM/DL (ref 2.4–3.5)
GLUCOSE SERPL-MCNC: 98 MG/DL (ref 82–115)
HDLC SERPL-MCNC: 55 MG/DL (ref 35–60)
LDLC SERPL CALC-MCNC: 78 MG/DL (ref 50–140)
POTASSIUM SERPL-SCNC: 5.1 MMOL/L (ref 3.5–5.1)
PROT SERPL-MCNC: 7.4 GM/DL (ref 5.8–7.6)
SODIUM SERPL-SCNC: 141 MMOL/L (ref 136–145)
TRIGL SERPL-MCNC: 86 MG/DL (ref 34–140)
VLDLC SERPL CALC-MCNC: 17 MG/DL

## 2021-02-16 ENCOUNTER — HISTORICAL (OUTPATIENT)
Dept: ADMINISTRATIVE | Facility: HOSPITAL | Age: 66
End: 2021-02-16

## 2021-02-16 LAB — SARS-COV-2 RNA RESP QL NAA+PROBE: NOT DETECTED

## 2021-03-22 ENCOUNTER — HISTORICAL (OUTPATIENT)
Dept: ADMINISTRATIVE | Facility: HOSPITAL | Age: 66
End: 2021-03-22

## 2021-03-22 LAB
ABS NEUT (OLG): 3.49 X10(3)/MCL (ref 2.1–9.2)
ALBUMIN SERPL-MCNC: 4 GM/DL (ref 3.4–4.8)
ALBUMIN/GLOB SERPL: 1.2 RATIO (ref 1.1–2)
ALP SERPL-CCNC: 69 UNIT/L (ref 40–150)
ALT SERPL-CCNC: 20 UNIT/L (ref 0–55)
AST SERPL-CCNC: 34 UNIT/L (ref 5–34)
BASOPHILS # BLD AUTO: 0.1 X10(3)/MCL (ref 0–0.2)
BASOPHILS NFR BLD AUTO: 1 %
BILIRUB SERPL-MCNC: 0.9 MG/DL
BILIRUBIN DIRECT+TOT PNL SERPL-MCNC: 0.4 MG/DL (ref 0–0.5)
BILIRUBIN DIRECT+TOT PNL SERPL-MCNC: 0.5 MG/DL (ref 0–0.8)
BUN SERPL-MCNC: 12.6 MG/DL (ref 8.4–25.7)
CALCIUM SERPL-MCNC: 9.3 MG/DL (ref 8.8–10)
CHLORIDE SERPL-SCNC: 103 MMOL/L (ref 98–107)
CHOLEST SERPL-MCNC: 144 MG/DL
CHOLEST/HDLC SERPL: 2 {RATIO} (ref 0–5)
CO2 SERPL-SCNC: 31 MMOL/L (ref 23–31)
CREAT SERPL-MCNC: 0.95 MG/DL (ref 0.73–1.18)
EOSINOPHIL # BLD AUTO: 0.1 X10(3)/MCL (ref 0–0.9)
EOSINOPHIL NFR BLD AUTO: 2 %
ERYTHROCYTE [DISTWIDTH] IN BLOOD BY AUTOMATED COUNT: 12.8 % (ref 11.5–14.5)
GLOBULIN SER-MCNC: 3.3 GM/DL (ref 2.4–3.5)
GLUCOSE SERPL-MCNC: 101 MG/DL (ref 82–115)
HCT VFR BLD AUTO: 46.9 % (ref 40–51)
HDLC SERPL-MCNC: 64 MG/DL (ref 35–60)
HGB BLD-MCNC: 16.1 GM/DL (ref 13.5–17.5)
IMM GRANULOCYTES # BLD AUTO: 0.02 10*3/UL
IMM GRANULOCYTES NFR BLD AUTO: 0 %
LDLC SERPL CALC-MCNC: 66 MG/DL (ref 50–140)
LYMPHOCYTES # BLD AUTO: 2.2 X10(3)/MCL (ref 0.6–4.6)
LYMPHOCYTES NFR BLD AUTO: 32 %
MCH RBC QN AUTO: 34.7 PG (ref 26–34)
MCHC RBC AUTO-ENTMCNC: 34.3 GM/DL (ref 31–37)
MCV RBC AUTO: 101.1 FL (ref 80–100)
MONOCYTES # BLD AUTO: 1.1 X10(3)/MCL (ref 0.1–1.3)
MONOCYTES NFR BLD AUTO: 16 %
NEUTROPHILS # BLD AUTO: 3.49 X10(3)/MCL (ref 2.1–9.2)
NEUTROPHILS NFR BLD AUTO: 50 %
PLATELET # BLD AUTO: 223 X10(3)/MCL (ref 130–400)
PMV BLD AUTO: 9.1 FL (ref 7.4–10.4)
POTASSIUM SERPL-SCNC: 5.1 MMOL/L (ref 3.5–5.1)
PROT SERPL-MCNC: 7.3 GM/DL (ref 5.8–7.6)
RBC # BLD AUTO: 4.64 X10(6)/MCL (ref 4.5–5.9)
SODIUM SERPL-SCNC: 139 MMOL/L (ref 136–145)
TRIGL SERPL-MCNC: 70 MG/DL (ref 34–140)
VLDLC SERPL CALC-MCNC: 14 MG/DL
WBC # SPEC AUTO: 7 X10(3)/MCL (ref 4.5–11)

## 2021-06-21 ENCOUNTER — HISTORICAL (OUTPATIENT)
Dept: ADMINISTRATIVE | Facility: HOSPITAL | Age: 66
End: 2021-06-21

## 2021-06-21 LAB
ERYTHROCYTE [DISTWIDTH] IN BLOOD BY AUTOMATED COUNT: 13.4 % (ref 11.5–14.5)
HCT VFR BLD AUTO: 45.2 % (ref 40–51)
HGB BLD-MCNC: 15.3 GM/DL (ref 13.5–17.5)
MCH RBC QN AUTO: 34.6 PG (ref 26–34)
MCHC RBC AUTO-ENTMCNC: 33.8 GM/DL (ref 31–37)
MCV RBC AUTO: 102.3 FL (ref 80–100)
NRBC BLD AUTO-RTO: 0 % (ref 0–0.2)
PLATELET # BLD AUTO: 254 X10(3)/MCL (ref 130–400)
PMV BLD AUTO: 9 FL (ref 7.4–10.4)
PSA SERPL-MCNC: 1.04 NG/ML
RBC # BLD AUTO: 4.42 X10(6)/MCL (ref 4.5–5.9)
WBC # SPEC AUTO: 6.8 X10(3)/MCL (ref 4.5–11)

## 2021-08-27 ENCOUNTER — HISTORICAL (OUTPATIENT)
Dept: ADMINISTRATIVE | Facility: HOSPITAL | Age: 66
End: 2021-08-27

## 2021-08-27 LAB
ABS NEUT (OLG): 10.48 X10(3)/MCL (ref 2.1–9.2)
ALBUMIN SERPL-MCNC: 2.4 GM/DL (ref 3.4–4.8)
ALBUMIN/GLOB SERPL: 0.6 RATIO (ref 1.1–2)
ALP SERPL-CCNC: 53 UNIT/L (ref 40–150)
ALT SERPL-CCNC: 30 UNIT/L (ref 0–55)
APTT PPP: 47.6 SECOND(S) (ref 23.3–37)
AST SERPL-CCNC: 34 UNIT/L (ref 5–34)
BASOPHILS # BLD AUTO: 0 X10(3)/MCL (ref 0–0.2)
BASOPHILS NFR BLD AUTO: 0 %
BILIRUB SERPL-MCNC: 0.5 MG/DL
BILIRUBIN DIRECT+TOT PNL SERPL-MCNC: 0.2 MG/DL (ref 0–0.8)
BILIRUBIN DIRECT+TOT PNL SERPL-MCNC: 0.3 MG/DL (ref 0–0.5)
BNP BLD-MCNC: 340.4 PG/ML
BUN SERPL-MCNC: 10.8 MG/DL (ref 8.4–25.7)
CALCIUM SERPL-MCNC: 9.5 MG/DL (ref 8.8–10)
CHLORIDE SERPL-SCNC: 102 MMOL/L (ref 98–107)
CO2 SERPL-SCNC: 24 MMOL/L (ref 23–31)
CREAT SERPL-MCNC: 0.68 MG/DL (ref 0.73–1.18)
EOSINOPHIL # BLD AUTO: 0 X10(3)/MCL (ref 0–0.9)
EOSINOPHIL NFR BLD AUTO: 0 %
ERYTHROCYTE [DISTWIDTH] IN BLOOD BY AUTOMATED COUNT: 12.8 % (ref 11.5–14.5)
EST. AVERAGE GLUCOSE BLD GHB EST-MCNC: 93.9 MG/DL
FOLATE SERPL-MCNC: 12.4 NG/ML (ref 7–31.4)
GLOBULIN SER-MCNC: 4.3 GM/DL (ref 2.4–3.5)
GLUCOSE SERPL-MCNC: 101 MG/DL (ref 82–115)
HAPTOGLOB SERPL-MCNC: 455 MG/DL (ref 40–368)
HAV IGM SERPL QL IA: NONREACTIVE
HBA1C MFR BLD: 4.9 %
HBV CORE IGM SERPL QL IA: NONREACTIVE
HBV SURFACE AG SERPL QL IA: NONREACTIVE
HCT VFR BLD AUTO: 26.8 % (ref 40–51)
HCV AB SERPL QL IA: NONREACTIVE
HGB BLD-MCNC: 8.7 GM/DL (ref 13.5–17.5)
HIV 1+2 AB+HIV1 P24 AG SERPL QL IA: NONREACTIVE
IMM GRANULOCYTES # BLD AUTO: 0.06 10*3/UL
IMM GRANULOCYTES NFR BLD AUTO: 0 %
INR PPP: 2.3 (ref 0.9–1.2)
IRON SATN MFR SERPL: 7 % (ref 20–50)
IRON SERPL-MCNC: 13 UG/DL (ref 65–175)
LACTATE SERPL-SCNC: 1 MMOL/L (ref 0.5–2.2)
LACTATE SERPL-SCNC: 2.4 MMOL/L (ref 0.5–2.2)
LDH SERPL-CCNC: 182 UNIT/L (ref 140–271)
LYMPHOCYTES # BLD AUTO: 1.3 X10(3)/MCL (ref 0.6–4.6)
LYMPHOCYTES NFR BLD AUTO: 10 %
MCH RBC QN AUTO: 32.3 PG (ref 26–34)
MCHC RBC AUTO-ENTMCNC: 32.5 GM/DL (ref 31–37)
MCV RBC AUTO: 99.6 FL (ref 80–100)
MONOCYTES # BLD AUTO: 1.5 X10(3)/MCL (ref 0.1–1.3)
MONOCYTES NFR BLD AUTO: 11 %
NEUTROPHILS # BLD AUTO: 10.48 X10(3)/MCL (ref 2.1–9.2)
NEUTROPHILS NFR BLD AUTO: 78 %
NRBC BLD AUTO-RTO: 0 % (ref 0–0.2)
PLATELET # BLD AUTO: 534 X10(3)/MCL (ref 130–400)
PMV BLD AUTO: 8.8 FL (ref 7.4–10.4)
POTASSIUM SERPL-SCNC: 3.8 MMOL/L (ref 3.5–5.1)
PROT SERPL-MCNC: 6.7 GM/DL (ref 5.8–7.6)
PROTHROMBIN TIME: 25 SECOND(S) (ref 11.9–14.4)
RBC # BLD AUTO: 2.69 X10(6)/MCL (ref 4.5–5.9)
RET# (OHS): 0.07 X10(6)/MCL (ref 0.02–0.09)
RETICULOCYTE COUNT AUTOMATED (OLG): 2.8 % (ref 0.5–1.5)
SARS-COV-2 AG RESP QL IA.RAPID: NEGATIVE
SODIUM SERPL-SCNC: 136 MMOL/L (ref 136–145)
T PALLIDUM AB SER QL: NONREACTIVE
TIBC SERPL-MCNC: 172 UG/DL (ref 69–240)
TIBC SERPL-MCNC: 185 UG/DL (ref 250–450)
TRANSFERRIN SERPL-MCNC: 160 MG/DL (ref 163–344)
TROPONIN I SERPL-MCNC: <0.01 NG/ML (ref 0–0.04)
TSH SERPL-ACNC: 0.76 UIU/ML (ref 0.35–4.94)
VIT B12 SERPL-MCNC: 1241 PG/ML (ref 213–816)
WBC # SPEC AUTO: 13.4 X10(3)/MCL (ref 4.5–11)

## 2021-08-28 LAB
ABS NEUT (OLG): 10.37 X10(3)/MCL (ref 2.1–9.2)
ALBUMIN SERPL-MCNC: 2.1 GM/DL (ref 3.4–4.8)
ALBUMIN/GLOB SERPL: 0.5 RATIO (ref 1.1–2)
ALP SERPL-CCNC: 49 UNIT/L (ref 40–150)
ALT SERPL-CCNC: 22 UNIT/L (ref 0–55)
AST SERPL-CCNC: 28 UNIT/L (ref 5–34)
BASOPHILS # BLD AUTO: 0 X10(3)/MCL (ref 0–0.2)
BASOPHILS NFR BLD AUTO: 0 %
BILIRUB SERPL-MCNC: 0.4 MG/DL
BILIRUBIN DIRECT+TOT PNL SERPL-MCNC: 0.1 MG/DL (ref 0–0.8)
BILIRUBIN DIRECT+TOT PNL SERPL-MCNC: 0.3 MG/DL (ref 0–0.5)
BUN SERPL-MCNC: 11 MG/DL (ref 8.4–25.7)
CALCIUM SERPL-MCNC: 8.8 MG/DL (ref 8.8–10)
CHLORIDE SERPL-SCNC: 102 MMOL/L (ref 98–107)
CO2 SERPL-SCNC: 24 MMOL/L (ref 23–31)
CREAT SERPL-MCNC: 0.68 MG/DL (ref 0.73–1.18)
EOSINOPHIL # BLD AUTO: 0 X10(3)/MCL (ref 0–0.9)
EOSINOPHIL NFR BLD AUTO: 0 %
ERYTHROCYTE [DISTWIDTH] IN BLOOD BY AUTOMATED COUNT: 12.9 % (ref 11.5–14.5)
GLOBULIN SER-MCNC: 4 GM/DL (ref 2.4–3.5)
GLUCOSE SERPL-MCNC: 102 MG/DL (ref 82–115)
HCT VFR BLD AUTO: 23.1 % (ref 40–51)
HCT VFR BLD AUTO: 23.8 % (ref 40–51)
HCT VFR BLD AUTO: 23.9 % (ref 40–51)
HGB BLD-MCNC: 7.5 GM/DL (ref 13.5–17.5)
HGB BLD-MCNC: 7.6 GM/DL (ref 13.5–17.5)
HGB BLD-MCNC: 7.6 GM/DL (ref 13.5–17.5)
IMM GRANULOCYTES # BLD AUTO: 0.05 10*3/UL
IMM GRANULOCYTES NFR BLD AUTO: 0 %
LYMPHOCYTES # BLD AUTO: 1.1 X10(3)/MCL (ref 0.6–4.6)
LYMPHOCYTES NFR BLD AUTO: 9 %
MAGNESIUM SERPL-MCNC: 1.8 MG/DL (ref 1.6–2.6)
MCH RBC QN AUTO: 31.9 PG (ref 26–34)
MCHC RBC AUTO-ENTMCNC: 31.9 GM/DL (ref 31–37)
MCV RBC AUTO: 100 FL (ref 80–100)
MONOCYTES # BLD AUTO: 1.6 X10(3)/MCL (ref 0.1–1.3)
MONOCYTES NFR BLD AUTO: 12 %
NEUTROPHILS # BLD AUTO: 10.37 X10(3)/MCL (ref 2.1–9.2)
NEUTROPHILS NFR BLD AUTO: 78 %
NRBC BLD AUTO-RTO: 0 % (ref 0–0.2)
PHOSPHATE SERPL-MCNC: 3 MG/DL (ref 2.3–4.7)
PLATELET # BLD AUTO: 466 X10(3)/MCL (ref 130–400)
PMV BLD AUTO: 8.6 FL (ref 7.4–10.4)
POTASSIUM SERPL-SCNC: 4.1 MMOL/L (ref 3.5–5.1)
PROT SERPL-MCNC: 6.1 GM/DL (ref 5.8–7.6)
RBC # BLD AUTO: 2.38 X10(6)/MCL (ref 4.5–5.9)
SODIUM SERPL-SCNC: 134 MMOL/L (ref 136–145)
WBC # SPEC AUTO: 13.2 X10(3)/MCL (ref 4.5–11)

## 2021-08-29 LAB
ABS NEUT (OLG): 10.4 X10(3)/MCL (ref 2.1–9.2)
ALBUMIN SERPL-MCNC: 2 GM/DL (ref 3.4–4.8)
ALBUMIN/GLOB SERPL: 0.5 RATIO (ref 1.1–2)
ALP SERPL-CCNC: 49 UNIT/L (ref 40–150)
ALT SERPL-CCNC: 27 UNIT/L (ref 0–55)
AST SERPL-CCNC: 35 UNIT/L (ref 5–34)
BASOPHILS # BLD AUTO: 0 X10(3)/MCL (ref 0–0.2)
BASOPHILS NFR BLD AUTO: 0 %
BILIRUB SERPL-MCNC: 0.3 MG/DL
BILIRUBIN DIRECT+TOT PNL SERPL-MCNC: 0.1 MG/DL (ref 0–0.8)
BILIRUBIN DIRECT+TOT PNL SERPL-MCNC: 0.2 MG/DL (ref 0–0.5)
BUN SERPL-MCNC: 11 MG/DL (ref 8.4–25.7)
CALCIUM SERPL-MCNC: 8.7 MG/DL (ref 8.8–10)
CHLORIDE SERPL-SCNC: 100 MMOL/L (ref 98–107)
CO2 SERPL-SCNC: 23 MMOL/L (ref 23–31)
CREAT SERPL-MCNC: 0.71 MG/DL (ref 0.73–1.18)
EOSINOPHIL # BLD AUTO: 0 X10(3)/MCL (ref 0–0.9)
EOSINOPHIL NFR BLD AUTO: 0 %
ERYTHROCYTE [DISTWIDTH] IN BLOOD BY AUTOMATED COUNT: 12.9 % (ref 11.5–14.5)
GLOBULIN SER-MCNC: 4 GM/DL (ref 2.4–3.5)
GLUCOSE SERPL-MCNC: 101 MG/DL (ref 82–115)
HCT VFR BLD AUTO: 23.5 % (ref 40–51)
HGB BLD-MCNC: 7.5 GM/DL (ref 13.5–17.5)
IMM GRANULOCYTES # BLD AUTO: 0.05 10*3/UL
IMM GRANULOCYTES NFR BLD AUTO: 0 %
LYMPHOCYTES # BLD AUTO: 1.3 X10(3)/MCL (ref 0.6–4.6)
LYMPHOCYTES NFR BLD AUTO: 10 %
MAGNESIUM SERPL-MCNC: 1.7 MG/DL (ref 1.6–2.6)
MCH RBC QN AUTO: 31.6 PG (ref 26–34)
MCHC RBC AUTO-ENTMCNC: 31.9 GM/DL (ref 31–37)
MCV RBC AUTO: 99.2 FL (ref 80–100)
MONOCYTES # BLD AUTO: 1.5 X10(3)/MCL (ref 0.1–1.3)
MONOCYTES NFR BLD AUTO: 11 %
NEUTROPHILS # BLD AUTO: 10.4 X10(3)/MCL (ref 2.1–9.2)
NEUTROPHILS NFR BLD AUTO: 78 %
NRBC BLD AUTO-RTO: 0 % (ref 0–0.2)
PHOSPHATE SERPL-MCNC: 2.6 MG/DL (ref 2.3–4.7)
PLATELET # BLD AUTO: 465 X10(3)/MCL (ref 130–400)
PMV BLD AUTO: 8.8 FL (ref 7.4–10.4)
POTASSIUM SERPL-SCNC: 3.8 MMOL/L (ref 3.5–5.1)
PROT SERPL-MCNC: 6 GM/DL (ref 5.8–7.6)
RBC # BLD AUTO: 2.37 X10(6)/MCL (ref 4.5–5.9)
SODIUM SERPL-SCNC: 131 MMOL/L (ref 136–145)
WBC # SPEC AUTO: 13.3 X10(3)/MCL (ref 4.5–11)

## 2021-08-30 LAB
ABS NEUT (OLG): 10 X10(3)/MCL (ref 2.1–9.2)
ALBUMIN SERPL-MCNC: 2 GM/DL (ref 3.4–4.8)
ALBUMIN/GLOB SERPL: 0.5 RATIO (ref 1.1–2)
ALP SERPL-CCNC: 50 UNIT/L (ref 40–150)
ALT SERPL-CCNC: 44 UNIT/L (ref 0–55)
AST SERPL-CCNC: 60 UNIT/L (ref 5–34)
BASOPHILS # BLD AUTO: 0 X10(3)/MCL (ref 0–0.2)
BASOPHILS NFR BLD AUTO: 0 %
BILIRUB SERPL-MCNC: 0.4 MG/DL
BILIRUBIN DIRECT+TOT PNL SERPL-MCNC: 0.2 MG/DL (ref 0–0.5)
BILIRUBIN DIRECT+TOT PNL SERPL-MCNC: 0.2 MG/DL (ref 0–0.8)
BUN SERPL-MCNC: 9.4 MG/DL (ref 8.4–25.7)
CALCIUM SERPL-MCNC: 8.9 MG/DL (ref 8.8–10)
CHLORIDE SERPL-SCNC: 101 MMOL/L (ref 98–107)
CO2 SERPL-SCNC: 24 MMOL/L (ref 23–31)
CREAT SERPL-MCNC: 0.55 MG/DL (ref 0.73–1.18)
CROSSMATCH INTERPRETATION: NORMAL
CRP SERPL-MCNC: 15.39 MG/DL
CRP SERPL-MCNC: 16.06 MG/DL
EOSINOPHIL # BLD AUTO: 0 X10(3)/MCL (ref 0–0.9)
EOSINOPHIL NFR BLD AUTO: 0 %
ERYTHROCYTE [DISTWIDTH] IN BLOOD BY AUTOMATED COUNT: 13 % (ref 11.5–14.5)
ERYTHROCYTE [SEDIMENTATION RATE] IN BLOOD: 124 MM/HR (ref 0–15)
FERRITIN SERPL-MCNC: 177.8 NG/ML (ref 21.81–274.66)
GLOBULIN SER-MCNC: 3.9 GM/DL (ref 2.4–3.5)
GLUCOSE SERPL-MCNC: 96 MG/DL (ref 82–115)
HCT VFR BLD AUTO: 23.6 % (ref 40–51)
HGB BLD-MCNC: 7.5 GM/DL (ref 13.5–17.5)
IMM GRANULOCYTES # BLD AUTO: 0.07 10*3/UL
IMM GRANULOCYTES NFR BLD AUTO: 1 %
LYMPHOCYTES # BLD AUTO: 1.2 X10(3)/MCL (ref 0.6–4.6)
LYMPHOCYTES NFR BLD AUTO: 9 %
MAGNESIUM SERPL-MCNC: 1.7 MG/DL (ref 1.6–2.6)
MCH RBC QN AUTO: 31.4 PG (ref 26–34)
MCHC RBC AUTO-ENTMCNC: 31.8 GM/DL (ref 31–37)
MCV RBC AUTO: 98.7 FL (ref 80–100)
MONOCYTES # BLD AUTO: 1.3 X10(3)/MCL (ref 0.1–1.3)
MONOCYTES NFR BLD AUTO: 10 %
NEUTROPHILS # BLD AUTO: 10 X10(3)/MCL (ref 2.1–9.2)
NEUTROPHILS NFR BLD AUTO: 79 %
NRBC BLD AUTO-RTO: 0 % (ref 0–0.2)
PHOSPHATE SERPL-MCNC: 2.8 MG/DL (ref 2.3–4.7)
PLATELET # BLD AUTO: 466 X10(3)/MCL (ref 130–400)
PMV BLD AUTO: 8.8 FL (ref 7.4–10.4)
POTASSIUM SERPL-SCNC: 4.4 MMOL/L (ref 3.5–5.1)
PRODUCT READY: NORMAL
PRODUCT READY: NORMAL
PROT SERPL-MCNC: 5.9 GM/DL (ref 5.8–7.6)
RBC # BLD AUTO: 2.39 X10(6)/MCL (ref 4.5–5.9)
SODIUM SERPL-SCNC: 133 MMOL/L (ref 136–145)
TRANSFUSION ORDER: NORMAL
TROPONIN I SERPL-MCNC: <0.01 NG/ML (ref 0–0.04)
WBC # SPEC AUTO: 12.6 X10(3)/MCL (ref 4.5–11)

## 2021-08-31 LAB
ABS NEUT (OLG): 10.88 X10(3)/MCL (ref 2.1–9.2)
ALBUMIN SERPL-MCNC: 2.2 GM/DL (ref 3.4–4.8)
ALBUMIN/GLOB SERPL: 0.5 RATIO (ref 1.1–2)
ALP SERPL-CCNC: 56 UNIT/L (ref 40–150)
ALT SERPL-CCNC: 57 UNIT/L (ref 0–55)
APPEARANCE, UA: CLEAR
AST SERPL-CCNC: 65 UNIT/L (ref 5–34)
BACTERIA #/AREA URNS AUTO: ABNORMAL /HPF
BASOPHILS # BLD AUTO: 0 X10(3)/MCL (ref 0–0.2)
BASOPHILS NFR BLD AUTO: 0 %
BILIRUB SERPL-MCNC: 1.1 MG/DL
BILIRUB UR QL STRIP: NEGATIVE
BILIRUBIN DIRECT+TOT PNL SERPL-MCNC: 0.5 MG/DL (ref 0–0.8)
BILIRUBIN DIRECT+TOT PNL SERPL-MCNC: 0.6 MG/DL (ref 0–0.5)
BUN SERPL-MCNC: 6.5 MG/DL (ref 8.4–25.7)
CALCIUM SERPL-MCNC: 9.7 MG/DL (ref 8.8–10)
CHLORIDE SERPL-SCNC: 100 MMOL/L (ref 98–107)
CO2 SERPL-SCNC: 24 MMOL/L (ref 23–31)
COLOR UR: ABNORMAL
CREAT SERPL-MCNC: 0.65 MG/DL (ref 0.73–1.18)
EOSINOPHIL # BLD AUTO: 0 X10(3)/MCL (ref 0–0.9)
EOSINOPHIL NFR BLD AUTO: 0 %
ERYTHROCYTE [DISTWIDTH] IN BLOOD BY AUTOMATED COUNT: 14.5 % (ref 11.5–14.5)
GLOBULIN SER-MCNC: 4.6 GM/DL (ref 2.4–3.5)
GLUCOSE (UA): NEGATIVE
GLUCOSE SERPL-MCNC: 105 MG/DL (ref 82–115)
HCT VFR BLD AUTO: 29.8 % (ref 40–51)
HGB BLD-MCNC: 9.8 GM/DL (ref 13.5–17.5)
HGB UR QL STRIP: NEGATIVE
HYALINE CASTS #/AREA URNS LPF: ABNORMAL /LPF
IMM GRANULOCYTES # BLD AUTO: 0.06 10*3/UL
IMM GRANULOCYTES NFR BLD AUTO: 0 %
KETONES UR QL STRIP: NEGATIVE
LEUKOCYTE ESTERASE UR QL STRIP: NEGATIVE
LYMPHOCYTES # BLD AUTO: 0.8 X10(3)/MCL (ref 0.6–4.6)
LYMPHOCYTES NFR BLD AUTO: 6 %
MAGNESIUM SERPL-MCNC: 1.7 MG/DL (ref 1.6–2.6)
MCH RBC QN AUTO: 31.5 PG (ref 26–34)
MCHC RBC AUTO-ENTMCNC: 32.9 GM/DL (ref 31–37)
MCV RBC AUTO: 95.8 FL (ref 80–100)
MONOCYTES # BLD AUTO: 1.5 X10(3)/MCL (ref 0.1–1.3)
MONOCYTES NFR BLD AUTO: 11 %
NEUTROPHILS # BLD AUTO: 10.88 X10(3)/MCL (ref 2.1–9.2)
NEUTROPHILS NFR BLD AUTO: 82 %
NITRITE UR QL STRIP: NEGATIVE
NRBC BLD AUTO-RTO: 0 % (ref 0–0.2)
PH UR STRIP: 7 [PH] (ref 4.5–8)
PHOSPHATE SERPL-MCNC: 3.1 MG/DL (ref 2.3–4.7)
PLATELET # BLD AUTO: 466 X10(3)/MCL (ref 130–400)
PMV BLD AUTO: 9.2 FL (ref 7.4–10.4)
POTASSIUM SERPL-SCNC: 4.2 MMOL/L (ref 3.5–5.1)
PROT SERPL-MCNC: 6.8 GM/DL (ref 5.8–7.6)
PROT UR QL STRIP: NEGATIVE
RBC # BLD AUTO: 3.11 X10(6)/MCL (ref 4.5–5.9)
RBC #/AREA URNS AUTO: ABNORMAL /HPF
SODIUM SERPL-SCNC: 135 MMOL/L (ref 136–145)
SP GR UR STRIP: 1 (ref 1–1.03)
SQUAMOUS #/AREA URNS LPF: ABNORMAL /LPF
UROBILINOGEN UR STRIP-ACNC: NORMAL
WBC # SPEC AUTO: 13.3 X10(3)/MCL (ref 4.5–11)
WBC #/AREA URNS AUTO: ABNORMAL /HPF

## 2021-09-01 LAB
ABS NEUT (OLG): 11.47 X10(3)/MCL (ref 2.1–9.2)
ALBUMIN SERPL-MCNC: 2.1 GM/DL (ref 3.4–4.8)
ALBUMIN/GLOB SERPL: 0.5 RATIO (ref 1.1–2)
ALP SERPL-CCNC: 59 UNIT/L (ref 40–150)
ALT SERPL-CCNC: 42 UNIT/L (ref 0–55)
ANTINUCLEAR ANTIBODY SCREEN (OHS): NEGATIVE
ANTINUCLEAR ANTIBODY SCREEN (OHS): NEGATIVE
AST SERPL-CCNC: 34 UNIT/L (ref 5–34)
BASOPHILS # BLD AUTO: 0.1 X10(3)/MCL (ref 0–0.2)
BASOPHILS NFR BLD AUTO: 0 %
BILIRUB SERPL-MCNC: 0.6 MG/DL
BILIRUBIN DIRECT+TOT PNL SERPL-MCNC: 0.2 MG/DL (ref 0–0.8)
BILIRUBIN DIRECT+TOT PNL SERPL-MCNC: 0.4 MG/DL (ref 0–0.5)
BUN SERPL-MCNC: 7.5 MG/DL (ref 8.4–25.7)
CALCIUM SERPL-MCNC: 9.8 MG/DL (ref 8.8–10)
CENTROMERE PROTEIN ANTIBODY (OHS): NEGATIVE
CHLORIDE SERPL-SCNC: 101 MMOL/L (ref 98–107)
CK SERPL-CCNC: 21 U/L (ref 30–200)
CO2 SERPL-SCNC: 26 MMOL/L (ref 23–31)
CREAT SERPL-MCNC: 0.61 MG/DL (ref 0.73–1.18)
DSDNA ANTIBODY (OHS): NEGATIVE
DSDNA ANTIBODY (OHS): NEGATIVE
EOSINOPHIL # BLD AUTO: 0 X10(3)/MCL (ref 0–0.9)
EOSINOPHIL NFR BLD AUTO: 0 %
ERYTHROCYTE [DISTWIDTH] IN BLOOD BY AUTOMATED COUNT: 14.3 % (ref 11.5–14.5)
FINAL CULTURE: NORMAL
FINAL CULTURE: NORMAL
GLOBULIN SER-MCNC: 4.4 GM/DL (ref 2.4–3.5)
GLUCOSE SERPL-MCNC: 93 MG/DL (ref 82–115)
HCT VFR BLD AUTO: 30.3 % (ref 40–51)
HGB BLD-MCNC: 9.8 GM/DL (ref 13.5–17.5)
IMM GRANULOCYTES # BLD AUTO: 0.05 10*3/UL
IMM GRANULOCYTES NFR BLD AUTO: 0 %
IRON SERPL-MCNC: 18 UG/DL (ref 65–175)
JO-1 ANTIBODY (OHS): NEGATIVE
JO-1 ANTIBODY (OHS): NEGATIVE
LYMPHOCYTES # BLD AUTO: 1 X10(3)/MCL (ref 0.6–4.6)
LYMPHOCYTES NFR BLD AUTO: 8 %
MAGNESIUM SERPL-MCNC: 1.8 MG/DL (ref 1.6–2.6)
MCH RBC QN AUTO: 31.8 PG (ref 26–34)
MCHC RBC AUTO-ENTMCNC: 32.3 GM/DL (ref 31–37)
MCV RBC AUTO: 98.4 FL (ref 80–100)
MONOCYTES # BLD AUTO: 1.4 X10(3)/MCL (ref 0.1–1.3)
MONOCYTES NFR BLD AUTO: 10 %
NEUTROPHILS # BLD AUTO: 11.47 X10(3)/MCL (ref 2.1–9.2)
NEUTROPHILS NFR BLD AUTO: 82 %
NRBC BLD AUTO-RTO: 0 % (ref 0–0.2)
PHOSPHATE SERPL-MCNC: 3 MG/DL (ref 2.3–4.7)
PLATELET # BLD AUTO: 510 X10(3)/MCL (ref 130–400)
PMV BLD AUTO: 8.8 FL (ref 7.4–10.4)
POTASSIUM SERPL-SCNC: 4.2 MMOL/L (ref 3.5–5.1)
PROT SERPL-MCNC: 6.5 GM/DL (ref 5.8–7.6)
RBC # BLD AUTO: 3.08 X10(6)/MCL (ref 4.5–5.9)
RNP70 ANTIBODY (OHS): NEGATIVE
SCLERODERMA (SCL-70S) ANTIBODY (OHS): NEGATIVE
SMITH DP IGG (OHS): NEGATIVE
SODIUM SERPL-SCNC: 135 MMOL/L (ref 136–145)
SSA(RO) ANTIBODY (OHS): NEGATIVE
SSA(RO) ANTIBODY (OHS): NEGATIVE
SSB(LA) ANTIBODY (OHS): NEGATIVE
U1RNP ANTIBODY (OHS): NEGATIVE
WBC # SPEC AUTO: 14 X10(3)/MCL (ref 4.5–11)

## 2021-09-13 ENCOUNTER — HISTORICAL (OUTPATIENT)
Dept: ADMINISTRATIVE | Facility: HOSPITAL | Age: 66
End: 2021-09-13

## 2021-09-13 LAB
ABS NEUT (OLG): 14.39 X10(3)/MCL (ref 2.1–9.2)
BASOPHILS # BLD AUTO: 0 X10(3)/MCL (ref 0–0.2)
BASOPHILS NFR BLD AUTO: 0 %
CRP SERPL-MCNC: 19.05 MG/DL
EOSINOPHIL # BLD AUTO: 0 X10(3)/MCL (ref 0–0.9)
EOSINOPHIL NFR BLD AUTO: 0 %
ERYTHROCYTE [DISTWIDTH] IN BLOOD BY AUTOMATED COUNT: 14.4 % (ref 11.5–14.5)
ERYTHROCYTE [SEDIMENTATION RATE] IN BLOOD: 114 MM/HR (ref 0–15)
HCT VFR BLD AUTO: 28 % (ref 40–51)
HGB BLD-MCNC: 8.8 GM/DL (ref 13.5–17.5)
IMM GRANULOCYTES # BLD AUTO: 0.09 10*3/UL
IMM GRANULOCYTES NFR BLD AUTO: 0 %
IRON SATN MFR SERPL: 8 % (ref 20–50)
IRON SERPL-MCNC: 13 UG/DL (ref 65–175)
LYMPHOCYTES # BLD AUTO: 0.9 X10(3)/MCL (ref 0.6–4.6)
LYMPHOCYTES NFR BLD AUTO: 5 %
MCH RBC QN AUTO: 30.2 PG (ref 26–34)
MCHC RBC AUTO-ENTMCNC: 31.4 GM/DL (ref 31–37)
MCV RBC AUTO: 96.2 FL (ref 80–100)
MONOCYTES # BLD AUTO: 1.7 X10(3)/MCL (ref 0.1–1.3)
MONOCYTES NFR BLD AUTO: 10 %
NEUTROPHILS # BLD AUTO: 14.39 X10(3)/MCL (ref 2.1–9.2)
NEUTROPHILS NFR BLD AUTO: 84 %
NRBC BLD AUTO-RTO: 0 % (ref 0–0.2)
PLATELET # BLD AUTO: 723 X10(3)/MCL (ref 130–400)
PMV BLD AUTO: 8.8 FL (ref 7.4–10.4)
RBC # BLD AUTO: 2.91 X10(6)/MCL (ref 4.5–5.9)
TIBC SERPL-MCNC: 160 UG/DL (ref 69–240)
TIBC SERPL-MCNC: 173 UG/DL (ref 250–450)
TRANSFERRIN SERPL-MCNC: 155 MG/DL (ref 163–344)
WBC # SPEC AUTO: 17.1 X10(3)/MCL (ref 4.5–11)

## 2021-09-16 ENCOUNTER — HISTORICAL (OUTPATIENT)
Dept: ADMINISTRATIVE | Facility: HOSPITAL | Age: 66
End: 2021-09-16

## 2021-09-16 LAB
ABS NEUT (OLG): 15.25 X10(3)/MCL (ref 2.1–9.2)
ALBUMIN SERPL-MCNC: 2 GM/DL (ref 3.4–4.8)
ALBUMIN/GLOB SERPL: 0.5 RATIO (ref 1.1–2)
ALP SERPL-CCNC: 61 UNIT/L (ref 40–150)
ALT SERPL-CCNC: 22 UNIT/L (ref 0–55)
APTT PPP: 34.2 SECOND(S) (ref 23.3–37)
AST SERPL-CCNC: 36 UNIT/L (ref 5–34)
BASOPHILS # BLD AUTO: 0 X10(3)/MCL (ref 0–0.2)
BASOPHILS NFR BLD AUTO: 0 %
BILIRUB SERPL-MCNC: 0.5 MG/DL
BILIRUBIN DIRECT+TOT PNL SERPL-MCNC: 0.2 MG/DL (ref 0–0.8)
BILIRUBIN DIRECT+TOT PNL SERPL-MCNC: 0.3 MG/DL (ref 0–0.5)
BUN SERPL-MCNC: 16.4 MG/DL (ref 8.4–25.7)
CALCIUM SERPL-MCNC: 13.3 MG/DL (ref 8.8–10)
CHLORIDE SERPL-SCNC: 93 MMOL/L (ref 98–107)
CK MB SERPL-MCNC: 0.5 NG/ML
CK SERPL-CCNC: 13 U/L (ref 30–200)
CO2 SERPL-SCNC: 29 MMOL/L (ref 23–31)
CREAT SERPL-MCNC: 0.77 MG/DL (ref 0.73–1.18)
EOSINOPHIL # BLD AUTO: 0 X10(3)/MCL (ref 0–0.9)
EOSINOPHIL NFR BLD AUTO: 0 %
ERYTHROCYTE [DISTWIDTH] IN BLOOD BY AUTOMATED COUNT: 14.6 % (ref 11.5–14.5)
GLOBULIN SER-MCNC: 4.3 GM/DL (ref 2.4–3.5)
GLUCOSE SERPL-MCNC: 137 MG/DL (ref 82–115)
HCO3 UR-SCNC: 32 MMOL/L (ref 22–26)
HCT VFR BLD AUTO: 26.7 % (ref 40–51)
HGB BLD-MCNC: 8.3 GM/DL (ref 13.5–17.5)
IMM GRANULOCYTES # BLD AUTO: 0.14 10*3/UL
IMM GRANULOCYTES NFR BLD AUTO: 1 %
INR PPP: 1.42 (ref 0.9–1.2)
LYMPHOCYTES # BLD AUTO: 1.1 X10(3)/MCL (ref 0.6–4.6)
LYMPHOCYTES NFR BLD AUTO: 6 %
MAGNESIUM SERPL-MCNC: 1.6 MG/DL (ref 1.6–2.6)
MCH RBC QN AUTO: 29.4 PG (ref 26–34)
MCHC RBC AUTO-ENTMCNC: 31.1 GM/DL (ref 31–37)
MCV RBC AUTO: 94.7 FL (ref 80–100)
MONOCYTES # BLD AUTO: 1.9 X10(3)/MCL (ref 0.1–1.3)
MONOCYTES NFR BLD AUTO: 10 %
NEUTROPHILS # BLD AUTO: 15.25 X10(3)/MCL (ref 2.1–9.2)
NEUTROPHILS NFR BLD AUTO: 83 %
NRBC BLD AUTO-RTO: 0 % (ref 0–0.2)
O2 HGB ARTERIAL: 92.8 % (ref 94–100)
PCO2 BLDA: 42 MMHG (ref 35–45)
PH SMN: 7.49 [PH] (ref 7.35–7.45)
PHOSPHATE SERPL-MCNC: 2.1 MG/DL (ref 2.3–4.7)
PLATELET # BLD AUTO: 682 X10(3)/MCL (ref 130–400)
PMV BLD AUTO: 9 FL (ref 7.4–10.4)
PO2 BLDA: 72 MMHG (ref 80–100)
POC ALLENS TEST: POSITIVE
POC BE: 7.9 (ref -2–2)
POC CO HGB: 1.7 %
POC CO2: 33.3 MMOL/L (ref 22–26)
POC MET HGB: 1.6 % (ref 0.4–1.5)
POC SAMPLESOURCE: ABNORMAL
POC SATURATED O2: 96 %
POC SITE: ABNORMAL
POC THB: 8.7 GM/DL (ref 12–16)
POC TREATMENT: ABNORMAL
POTASSIUM SERPL-SCNC: 3.1 MMOL/L (ref 3.5–5.1)
PROT SERPL-MCNC: 6.3 GM/DL (ref 5.8–7.6)
PROTHROMBIN TIME: 17.2 SECOND(S) (ref 11.9–14.4)
RBC # BLD AUTO: 2.82 X10(6)/MCL (ref 4.5–5.9)
SARS-COV-2 AG RESP QL IA.RAPID: NEGATIVE
SODIUM SERPL-SCNC: 139 MMOL/L (ref 136–145)
TROPONIN I SERPL-MCNC: 0.03 NG/ML (ref 0–0.04)
WBC # SPEC AUTO: 18.4 X10(3)/MCL (ref 4.5–11)

## 2021-09-17 LAB
ABS NEUT (OLG): 16.83 X10(3)/MCL (ref 2.1–9.2)
ALBUMIN SERPL-MCNC: 1.9 GM/DL (ref 3.4–4.8)
ALBUMIN SERPL-MCNC: 2 GM/DL (ref 3.4–4.8)
ALBUMIN/GLOB SERPL: 0.5 RATIO (ref 1.1–2)
ALBUMIN/GLOB SERPL: 0.5 RATIO (ref 1.1–2)
ALP SERPL-CCNC: 60 UNIT/L (ref 40–150)
ALP SERPL-CCNC: 62 UNIT/L (ref 40–150)
ALT SERPL-CCNC: 17 UNIT/L (ref 0–55)
ALT SERPL-CCNC: 19 UNIT/L (ref 0–55)
APPEARANCE, UA: CLEAR
AST SERPL-CCNC: 22 UNIT/L (ref 5–34)
AST SERPL-CCNC: 26 UNIT/L (ref 5–34)
BACTERIA #/AREA URNS AUTO: ABNORMAL /HPF
BASOPHILS # BLD AUTO: 0 X10(3)/MCL (ref 0–0.2)
BASOPHILS NFR BLD AUTO: 0 %
BILIRUB SERPL-MCNC: 0.6 MG/DL
BILIRUB SERPL-MCNC: 0.6 MG/DL
BILIRUB UR QL STRIP: NEGATIVE
BILIRUBIN DIRECT+TOT PNL SERPL-MCNC: 0.3 MG/DL (ref 0–0.5)
BILIRUBIN DIRECT+TOT PNL SERPL-MCNC: 0.3 MG/DL (ref 0–0.5)
BILIRUBIN DIRECT+TOT PNL SERPL-MCNC: 0.3 MG/DL (ref 0–0.8)
BILIRUBIN DIRECT+TOT PNL SERPL-MCNC: 0.3 MG/DL (ref 0–0.8)
BNP BLD-MCNC: 304.4 PG/ML
BUN SERPL-MCNC: 13.2 MG/DL (ref 8.4–25.7)
BUN SERPL-MCNC: 13.3 MG/DL (ref 8.4–25.7)
CALCIUM SERPL-MCNC: 12.9 MG/DL (ref 8.8–10)
CALCIUM SERPL-MCNC: 12.9 MG/DL (ref 8.8–10)
CHLORIDE SERPL-SCNC: 98 MMOL/L (ref 98–107)
CHLORIDE SERPL-SCNC: 99 MMOL/L (ref 98–107)
CHOLEST FLD-MCNC: 34 MG/DL
CO2 SERPL-SCNC: 30 MMOL/L (ref 23–31)
CO2 SERPL-SCNC: 31 MMOL/L (ref 23–31)
COLOR UR: NORMAL
CREAT SERPL-MCNC: 0.7 MG/DL (ref 0.73–1.18)
CREAT SERPL-MCNC: 0.71 MG/DL (ref 0.73–1.18)
DEPRECATED CALCIDIOL+CALCIFEROL SERPL-MC: 30.4 NG/ML (ref 30–80)
EOSINOPHIL # BLD AUTO: 0 X10(3)/MCL (ref 0–0.9)
EOSINOPHIL NFR BLD AUTO: 0 %
ERYTHROCYTE [DISTWIDTH] IN BLOOD BY AUTOMATED COUNT: 14.7 % (ref 11.5–14.5)
FERRITIN SERPL-MCNC: 218.05 NG/ML (ref 21.81–274.66)
GLOBULIN SER-MCNC: 3.8 GM/DL (ref 2.4–3.5)
GLOBULIN SER-MCNC: 4.2 GM/DL (ref 2.4–3.5)
GLUCOSE (UA): NEGATIVE
GLUCOSE FLD-MCNC: 105 MG/DL
GLUCOSE SERPL-MCNC: 103 MG/DL (ref 82–115)
GLUCOSE SERPL-MCNC: 91 MG/DL (ref 82–115)
HCT VFR BLD AUTO: 25.2 % (ref 40–51)
HGB BLD-MCNC: 7.9 GM/DL (ref 13.5–17.5)
HGB UR QL STRIP: NEGATIVE
HYALINE CASTS #/AREA URNS LPF: ABNORMAL /LPF
IMM GRANULOCYTES # BLD AUTO: 0.14 10*3/UL
IMM GRANULOCYTES NFR BLD AUTO: 1 %
IRON SATN MFR SERPL: 6 % (ref 20–50)
IRON SERPL-MCNC: 11 UG/DL (ref 65–175)
KETONES UR QL STRIP: NEGATIVE
LDH FLD-CCNC: 109 U/L
LDH SERPL-CCNC: 188 UNIT/L (ref 140–271)
LEUKOCYTE ESTERASE UR QL STRIP: NEGATIVE
LYMPHOCYTES # BLD AUTO: 0.6 X10(3)/MCL (ref 0.6–4.6)
LYMPHOCYTES NFR BLD AUTO: 3 %
MCH RBC QN AUTO: 30.4 PG (ref 26–34)
MCHC RBC AUTO-ENTMCNC: 31.3 GM/DL (ref 31–37)
MCV RBC AUTO: 96.9 FL (ref 80–100)
MONOCYTES # BLD AUTO: 1.8 X10(3)/MCL (ref 0.1–1.3)
MONOCYTES NFR BLD AUTO: 9 %
NEUTROPHILS # BLD AUTO: 16.83 X10(3)/MCL (ref 2.1–9.2)
NEUTROPHILS NFR BLD AUTO: 86 %
NITRITE UR QL STRIP: NEGATIVE
NRBC BLD AUTO-RTO: 0 % (ref 0–0.2)
PH BF TYPE: NORMAL
PH FLD: 8 [PH]
PH UR STRIP: 6.5 [PH] (ref 4.5–8)
PLATELET # BLD AUTO: 634 X10(3)/MCL (ref 130–400)
PMV BLD AUTO: 8.8 FL (ref 7.4–10.4)
POTASSIUM SERPL-SCNC: 3.2 MMOL/L (ref 3.5–5.1)
POTASSIUM SERPL-SCNC: 3.6 MMOL/L (ref 3.5–5.1)
PROT FLD-MCNC: 2.7 GM/DL
PROT SERPL-MCNC: 5.8 GM/DL (ref 5.8–7.6)
PROT SERPL-MCNC: 6.1 GM/DL (ref 5.8–7.6)
PROT UR QL STRIP: NEGATIVE
PTH-INTACT SERPL-MCNC: 8.5 PG/ML (ref 8.7–77)
RBC # BLD AUTO: 2.6 X10(6)/MCL (ref 4.5–5.9)
RBC #/AREA URNS AUTO: ABNORMAL /HPF
SODIUM SERPL-SCNC: 136 MMOL/L (ref 136–145)
SODIUM SERPL-SCNC: 137 MMOL/L (ref 136–145)
SP GR UR STRIP: 1.02 (ref 1–1.03)
SQUAMOUS #/AREA URNS LPF: ABNORMAL /LPF
TIBC SERPL-MCNC: 161 UG/DL (ref 69–240)
TIBC SERPL-MCNC: 172 UG/DL (ref 250–450)
TRANSFERRIN SERPL-MCNC: 144 MG/DL (ref 163–344)
TROPONIN I SERPL-MCNC: 0.01 NG/ML (ref 0–0.04)
UROBILINOGEN UR STRIP-ACNC: NORMAL
WBC # SPEC AUTO: 19.4 X10(3)/MCL (ref 4.5–11)
WBC #/AREA URNS AUTO: ABNORMAL /HPF

## 2021-09-18 LAB
ABS NEUT (OLG): 14.56 X10(3)/MCL (ref 2.1–9.2)
ALBUMIN SERPL-MCNC: 1.9 GM/DL (ref 3.4–4.8)
ALBUMIN SERPL-MCNC: 2 GM/DL (ref 3.4–4.8)
ALBUMIN/GLOB SERPL: 0.4 RATIO (ref 1.1–2)
ALBUMIN/GLOB SERPL: 0.4 RATIO (ref 1.1–2)
ALP SERPL-CCNC: 60 UNIT/L (ref 40–150)
ALP SERPL-CCNC: 63 UNIT/L (ref 40–150)
ALT SERPL-CCNC: 19 UNIT/L (ref 0–55)
ALT SERPL-CCNC: 21 UNIT/L (ref 0–55)
AST SERPL-CCNC: 31 UNIT/L (ref 5–34)
AST SERPL-CCNC: 31 UNIT/L (ref 5–34)
BASOPHILS # BLD AUTO: 0 X10(3)/MCL (ref 0–0.2)
BASOPHILS NFR BLD AUTO: 0 %
BILIRUB SERPL-MCNC: 0.6 MG/DL
BILIRUB SERPL-MCNC: 0.7 MG/DL
BILIRUBIN DIRECT+TOT PNL SERPL-MCNC: 0.2 MG/DL (ref 0–0.8)
BILIRUBIN DIRECT+TOT PNL SERPL-MCNC: 0.3 MG/DL (ref 0–0.5)
BILIRUBIN DIRECT+TOT PNL SERPL-MCNC: 0.4 MG/DL (ref 0–0.5)
BILIRUBIN DIRECT+TOT PNL SERPL-MCNC: 0.4 MG/DL (ref 0–0.8)
BUN SERPL-MCNC: 10.2 MG/DL (ref 8.4–25.7)
BUN SERPL-MCNC: 9 MG/DL (ref 8.4–25.7)
CALCIUM SERPL-MCNC: 12.6 MG/DL (ref 8.8–10)
CALCIUM SERPL-MCNC: 13.1 MG/DL (ref 8.8–10)
CHLORIDE SERPL-SCNC: 97 MMOL/L (ref 98–107)
CHLORIDE SERPL-SCNC: 98 MMOL/L (ref 98–107)
CO2 SERPL-SCNC: 26 MMOL/L (ref 23–31)
CO2 SERPL-SCNC: 31 MMOL/L (ref 23–31)
CREAT SERPL-MCNC: 0.65 MG/DL (ref 0.73–1.18)
CREAT SERPL-MCNC: 0.67 MG/DL (ref 0.73–1.18)
EOSINOPHIL # BLD AUTO: 0 X10(3)/MCL (ref 0–0.9)
EOSINOPHIL NFR BLD AUTO: 0 %
ERYTHROCYTE [DISTWIDTH] IN BLOOD BY AUTOMATED COUNT: 15.1 % (ref 11.5–14.5)
FLUAV AG UPPER RESP QL IA.RAPID: NOT DETECTED
FLUBV AG UPPER RESP QL IA.RAPID: NOT DETECTED
GLOBULIN SER-MCNC: 4.3 GM/DL (ref 2.4–3.5)
GLOBULIN SER-MCNC: 4.6 GM/DL (ref 2.4–3.5)
GLUCOSE SERPL-MCNC: 113 MG/DL (ref 82–115)
GLUCOSE SERPL-MCNC: 95 MG/DL (ref 82–115)
GRAM STN SPEC: NORMAL
HCT VFR BLD AUTO: 26.3 % (ref 40–51)
HGB BLD-MCNC: 8.1 GM/DL (ref 13.5–17.5)
IMM GRANULOCYTES # BLD AUTO: 0.09 10*3/UL
IMM GRANULOCYTES NFR BLD AUTO: 0 %
LYMPHOCYTES # BLD AUTO: 0.9 X10(3)/MCL (ref 0.6–4.6)
LYMPHOCYTES NFR BLD AUTO: 5 %
MAGNESIUM SERPL-MCNC: 1.6 MG/DL (ref 1.6–2.6)
MCH RBC QN AUTO: 29.9 PG (ref 26–34)
MCHC RBC AUTO-ENTMCNC: 30.8 GM/DL (ref 31–37)
MCV RBC AUTO: 97 FL (ref 80–100)
MONOCYTES # BLD AUTO: 1.9 X10(3)/MCL (ref 0.1–1.3)
MONOCYTES NFR BLD AUTO: 11 %
NEUTROPHILS # BLD AUTO: 14.56 X10(3)/MCL (ref 2.1–9.2)
NEUTROPHILS NFR BLD AUTO: 83 %
NRBC BLD AUTO-RTO: 0 % (ref 0–0.2)
PHOSPHATE SERPL-MCNC: 2.1 MG/DL (ref 2.3–4.7)
PLATELET # BLD AUTO: 535 X10(3)/MCL (ref 130–400)
PMV BLD AUTO: 9 FL (ref 7.4–10.4)
POTASSIUM SERPL-SCNC: 3.5 MMOL/L (ref 3.5–5.1)
POTASSIUM SERPL-SCNC: 3.6 MMOL/L (ref 3.5–5.1)
PROT SERPL-MCNC: 6.2 GM/DL (ref 5.8–7.6)
PROT SERPL-MCNC: 6.6 GM/DL (ref 5.8–7.6)
RBC # BLD AUTO: 2.71 X10(6)/MCL (ref 4.5–5.9)
SODIUM SERPL-SCNC: 134 MMOL/L (ref 136–145)
SODIUM SERPL-SCNC: 137 MMOL/L (ref 136–145)
WBC # SPEC AUTO: 17.5 X10(3)/MCL (ref 4.5–11)

## 2021-09-19 LAB
ABS NEUT (OLG): 15.71 X10(3)/MCL (ref 2.1–9.2)
ALBUMIN SERPL-MCNC: 1.8 GM/DL (ref 3.4–4.8)
ALBUMIN/GLOB SERPL: 0.4 RATIO (ref 1.1–2)
ALP SERPL-CCNC: 62 UNIT/L (ref 40–150)
ALT SERPL-CCNC: 15 UNIT/L (ref 0–55)
AST SERPL-CCNC: 20 UNIT/L (ref 5–34)
BASOPHILS # BLD AUTO: 0 X10(3)/MCL (ref 0–0.2)
BASOPHILS NFR BLD AUTO: 0 %
BILIRUB SERPL-MCNC: 0.5 MG/DL
BILIRUBIN DIRECT+TOT PNL SERPL-MCNC: 0.1 MG/DL (ref 0–0.8)
BILIRUBIN DIRECT+TOT PNL SERPL-MCNC: 0.4 MG/DL (ref 0–0.5)
BUN SERPL-MCNC: 9 MG/DL (ref 8.4–25.7)
CALCIUM SERPL-MCNC: 11.6 MG/DL (ref 8.8–10)
CHLORIDE SERPL-SCNC: 98 MMOL/L (ref 98–107)
CO2 SERPL-SCNC: 28 MMOL/L (ref 23–31)
CREAT SERPL-MCNC: 0.61 MG/DL (ref 0.73–1.18)
EOSINOPHIL # BLD AUTO: 0 X10(3)/MCL (ref 0–0.9)
EOSINOPHIL NFR BLD AUTO: 0 %
ERYTHROCYTE [DISTWIDTH] IN BLOOD BY AUTOMATED COUNT: 14.9 % (ref 11.5–14.5)
FINAL CULTURE: NO GROWTH
GLOBULIN SER-MCNC: 4.3 GM/DL (ref 2.4–3.5)
GLUCOSE SERPL-MCNC: 99 MG/DL (ref 82–115)
HCT VFR BLD AUTO: 23.8 % (ref 40–51)
HGB BLD-MCNC: 7.3 GM/DL (ref 13.5–17.5)
IMM GRANULOCYTES # BLD AUTO: 0.12 10*3/UL
IMM GRANULOCYTES NFR BLD AUTO: 1 %
LYMPHOCYTES # BLD AUTO: 0.6 X10(3)/MCL (ref 0.6–4.6)
LYMPHOCYTES NFR BLD AUTO: 3 %
MAGNESIUM SERPL-MCNC: 1.4 MG/DL (ref 1.6–2.6)
MCH RBC QN AUTO: 29.9 PG (ref 26–34)
MCHC RBC AUTO-ENTMCNC: 30.7 GM/DL (ref 31–37)
MCV RBC AUTO: 97.5 FL (ref 80–100)
MONOCYTES # BLD AUTO: 1.5 X10(3)/MCL (ref 0.1–1.3)
MONOCYTES NFR BLD AUTO: 8 %
NEUTROPHILS # BLD AUTO: 15.71 X10(3)/MCL (ref 2.1–9.2)
NEUTROPHILS NFR BLD AUTO: 87 %
NRBC BLD AUTO-RTO: 0 % (ref 0–0.2)
PHOSPHATE SERPL-MCNC: 2.2 MG/DL (ref 2.3–4.7)
PLATELET # BLD AUTO: 523 X10(3)/MCL (ref 130–400)
PMV BLD AUTO: 8.9 FL (ref 7.4–10.4)
POTASSIUM SERPL-SCNC: 3.5 MMOL/L (ref 3.5–5.1)
PROT SERPL-MCNC: 6.1 GM/DL (ref 5.8–7.6)
RBC # BLD AUTO: 2.44 X10(6)/MCL (ref 4.5–5.9)
SODIUM SERPL-SCNC: 134 MMOL/L (ref 136–145)
WBC # SPEC AUTO: 18 X10(3)/MCL (ref 4.5–11)

## 2021-09-20 LAB
ABS NEUT (OLG): 17.19 X10(3)/MCL (ref 2.1–9.2)
ALBUMIN SERPL-MCNC: 1.9 GM/DL (ref 3.4–4.8)
ALBUMIN/GLOB SERPL: 0.4 RATIO (ref 1.1–2)
ALP SERPL-CCNC: 68 UNIT/L (ref 40–150)
ALT SERPL-CCNC: 14 UNIT/L (ref 0–55)
AST SERPL-CCNC: 21 UNIT/L (ref 5–34)
BASOPHILS # BLD AUTO: 0 X10(3)/MCL (ref 0–0.2)
BASOPHILS NFR BLD AUTO: 0 %
BILIRUB SERPL-MCNC: 0.5 MG/DL
BILIRUBIN DIRECT+TOT PNL SERPL-MCNC: 0.2 MG/DL (ref 0–0.8)
BILIRUBIN DIRECT+TOT PNL SERPL-MCNC: 0.3 MG/DL (ref 0–0.5)
BNP BLD-MCNC: 403.8 PG/ML
BUN SERPL-MCNC: 9.1 MG/DL (ref 8.4–25.7)
CALCIUM SERPL-MCNC: 11.2 MG/DL (ref 8.8–10)
CHLORIDE SERPL-SCNC: 96 MMOL/L (ref 98–107)
CO2 SERPL-SCNC: 29 MMOL/L (ref 23–31)
CREAT SERPL-MCNC: 0.58 MG/DL (ref 0.73–1.18)
CREAT UR-MCNC: 59.9 MG/DL (ref 58–161)
EOSINOPHIL # BLD AUTO: 0 X10(3)/MCL (ref 0–0.9)
EOSINOPHIL NFR BLD AUTO: 0 %
ERYTHROCYTE [DISTWIDTH] IN BLOOD BY AUTOMATED COUNT: 14.9 % (ref 11.5–14.5)
GLOBULIN SER-MCNC: 4.3 GM/DL (ref 2.4–3.5)
GLUCOSE SERPL-MCNC: 78 MG/DL (ref 82–115)
HCT VFR BLD AUTO: 23.9 % (ref 40–51)
HGB BLD-MCNC: 7.3 GM/DL (ref 13.5–17.5)
IMM GRANULOCYTES # BLD AUTO: 0.12 10*3/UL
IMM GRANULOCYTES NFR BLD AUTO: 1 %
INR PPP: 1.6 (ref 0.9–1.2)
LYMPHOCYTES # BLD AUTO: 0.6 X10(3)/MCL (ref 0.6–4.6)
LYMPHOCYTES NFR BLD AUTO: 3 %
MCH RBC QN AUTO: 29.3 PG (ref 26–34)
MCHC RBC AUTO-ENTMCNC: 30.5 GM/DL (ref 31–37)
MCV RBC AUTO: 96 FL (ref 80–100)
MONOCYTES # BLD AUTO: 1.8 X10(3)/MCL (ref 0.1–1.3)
MONOCYTES NFR BLD AUTO: 9 %
NEUTROPHILS # BLD AUTO: 17.19 X10(3)/MCL (ref 2.1–9.2)
NEUTROPHILS NFR BLD AUTO: 87 %
NRBC BLD AUTO-RTO: 0 % (ref 0–0.2)
OSMOLALITY SERPL: 276 MOSM/KG (ref 280–300)
OSMOLALITY UR: 408 MOSM/KG (ref 300–1300)
PLATELET # BLD AUTO: 556 X10(3)/MCL (ref 130–400)
PMV BLD AUTO: 8.8 FL (ref 7.4–10.4)
POTASSIUM SERPL-SCNC: 3.2 MMOL/L (ref 3.5–5.1)
PROT SERPL-MCNC: 6.2 GM/DL (ref 5.8–7.6)
PROT UR STRIP-MCNC: 20.6 MG/DL
PROT/CREAT UR-RTO: 343.9 MG/GM CR
PROTHROMBIN TIME: 18.8 SECOND(S) (ref 11.9–14.4)
RBC # BLD AUTO: 2.49 X10(6)/MCL (ref 4.5–5.9)
SODIUM SERPL-SCNC: 135 MMOL/L (ref 136–145)
WBC # SPEC AUTO: 19.8 X10(3)/MCL (ref 4.5–11)

## 2021-09-21 LAB
ABS NEUT (OLG): 19.59 X10(3)/MCL (ref 2.1–9.2)
ALBUMIN SERPL-MCNC: 1.9 GM/DL (ref 3.4–4.8)
ALBUMIN/GLOB SERPL: 0.4 RATIO (ref 1.1–2)
ALP SERPL-CCNC: 64 UNIT/L (ref 40–150)
ALT SERPL-CCNC: 13 UNIT/L (ref 0–55)
APPEARANCE, UA: CLEAR
AST SERPL-CCNC: 23 UNIT/L (ref 5–34)
BACTERIA #/AREA URNS AUTO: ABNORMAL /HPF
BASOPHILS # BLD AUTO: 0 X10(3)/MCL (ref 0–0.2)
BASOPHILS NFR BLD AUTO: 0 %
BILIRUB SERPL-MCNC: 0.7 MG/DL
BILIRUB UR QL STRIP: NEGATIVE
BILIRUBIN DIRECT+TOT PNL SERPL-MCNC: 0.3 MG/DL (ref 0–0.8)
BILIRUBIN DIRECT+TOT PNL SERPL-MCNC: 0.4 MG/DL (ref 0–0.5)
BUN SERPL-MCNC: 9 MG/DL (ref 8.4–25.7)
BUN SERPL-MCNC: 9.4 MG/DL (ref 8.4–25.7)
CALCIUM SERPL-MCNC: 10.5 MG/DL (ref 8.8–10)
CALCIUM SERPL-MCNC: 10.8 MG/DL (ref 8.8–10)
CHLORIDE SERPL-SCNC: 96 MMOL/L (ref 98–107)
CHLORIDE SERPL-SCNC: 96 MMOL/L (ref 98–107)
CO2 SERPL-SCNC: 23 MMOL/L (ref 23–31)
CO2 SERPL-SCNC: 27 MMOL/L (ref 23–31)
COLOR UR: YELLOW
CREAT SERPL-MCNC: 0.61 MG/DL (ref 0.73–1.18)
CREAT SERPL-MCNC: 0.65 MG/DL (ref 0.73–1.18)
CREAT/UREA NIT SERPL: 15
CRP SERPL-MCNC: 22.73 MG/DL
EOSINOPHIL # BLD AUTO: 0 X10(3)/MCL (ref 0–0.9)
EOSINOPHIL NFR BLD AUTO: 0 %
ERYTHROCYTE [DISTWIDTH] IN BLOOD BY AUTOMATED COUNT: 15.2 % (ref 11.5–14.5)
ERYTHROCYTE [SEDIMENTATION RATE] IN BLOOD: 119 MM/HR (ref 0–15)
GLOBULIN SER-MCNC: 4.5 GM/DL (ref 2.4–3.5)
GLUCOSE (UA): NEGATIVE
GLUCOSE SERPL-MCNC: 81 MG/DL (ref 82–115)
GLUCOSE SERPL-MCNC: 93 MG/DL (ref 82–115)
HCT VFR BLD AUTO: 24.3 % (ref 40–51)
HGB BLD-MCNC: 7.5 GM/DL (ref 13.5–17.5)
HGB UR QL STRIP: NEGATIVE
HYALINE CASTS #/AREA URNS LPF: 0 /LPF
IMM GRANULOCYTES # BLD AUTO: 0.16 10*3/UL
IMM GRANULOCYTES NFR BLD AUTO: 1 %
KETONES UR QL STRIP: ABNORMAL
LACTATE SERPL-SCNC: 1.7 MMOL/L (ref 0.5–2.2)
LEUKOCYTE ESTERASE UR QL STRIP: NEGATIVE
LYMPHOCYTES # BLD AUTO: 0.5 X10(3)/MCL (ref 0.6–4.6)
LYMPHOCYTES NFR BLD AUTO: 2 %
MCH RBC QN AUTO: 29.6 PG (ref 26–34)
MCHC RBC AUTO-ENTMCNC: 30.9 GM/DL (ref 31–37)
MCV RBC AUTO: 96 FL (ref 80–100)
MONOCYTES # BLD AUTO: 2 X10(3)/MCL (ref 0.1–1.3)
MONOCYTES NFR BLD AUTO: 9 %
NEUTROPHILS # BLD AUTO: 19.59 X10(3)/MCL (ref 2.1–9.2)
NEUTROPHILS NFR BLD AUTO: 88 %
NITRITE UR QL STRIP: NEGATIVE
NRBC BLD AUTO-RTO: 0 % (ref 0–0.2)
OSMOLALITY UR: 390 MOSM/KG (ref 300–1300)
PH UR STRIP: 7 [PH] (ref 4.5–8)
PLATELET # BLD AUTO: 551 X10(3)/MCL (ref 130–400)
PMV BLD AUTO: 9.3 FL (ref 7.4–10.4)
POTASSIUM SERPL-SCNC: 3.3 MMOL/L (ref 3.5–5.1)
POTASSIUM SERPL-SCNC: 3.8 MMOL/L (ref 3.5–5.1)
PROT SERPL-MCNC: 6.4 GM/DL (ref 5.8–7.6)
PROT UR QL STRIP: 30 MG/DL
PSA SERPL-MCNC: 0.19 NG/ML
RBC # BLD AUTO: 2.53 X10(6)/MCL (ref 4.5–5.9)
RBC #/AREA URNS AUTO: ABNORMAL /HPF
SODIUM SERPL-SCNC: 132 MMOL/L (ref 136–145)
SODIUM SERPL-SCNC: 133 MMOL/L (ref 136–145)
SODIUM UR-SCNC: 64 MMOL/L
SP GR UR STRIP: 1.02 (ref 1–1.03)
SQUAMOUS #/AREA URNS LPF: ABNORMAL /LPF
URATE SERPL-MCNC: 2.2 MG/DL (ref 3.5–7.2)
UROBILINOGEN UR STRIP-ACNC: 4 MG/DL
WBC # SPEC AUTO: 22.3 X10(3)/MCL (ref 4.5–11)
WBC #/AREA URNS AUTO: ABNORMAL /HPF

## 2021-09-22 LAB
ABS NEUT (OLG): 20.42 X10(3)/MCL (ref 2.1–9.2)
ALBUMIN % SPEP (OHS): 37.85 % (ref 48.1–59.5)
ALBUMIN SERPL-MCNC: 1.9 GM/DL (ref 3.4–4.8)
ALBUMIN SERPL-MCNC: 2.1 GM/DL (ref 3.4–4.8)
ALBUMIN/GLOB SERPL: 0.5 RATIO (ref 1.1–2)
ALBUMIN/GLOB SERPL: 0.6 RATIO (ref 1.1–2)
ALP SERPL-CCNC: 83 UNIT/L (ref 40–150)
ALPHA 1 GLOB (OHS): 0.55 GM/DL (ref 0–0.4)
ALPHA 1 GLOB% (OHS): 9.31 % (ref 2.3–4.9)
ALPHA 2 GLOB % (OHS): 18.13 % (ref 6.9–13)
ALPHA 2 GLOB (OHS): 1.07 GM/DL (ref 0.4–1)
ALT SERPL-CCNC: 14 UNIT/L (ref 0–55)
AST SERPL-CCNC: 30 UNIT/L (ref 5–34)
BASOPHILS # BLD AUTO: 0.1 X10(3)/MCL (ref 0–0.2)
BASOPHILS NFR BLD AUTO: 0 %
BETA GLOB% (OHS): 20.5 % (ref 13.8–19.7)
BILIRUB SERPL-MCNC: 0.7 MG/DL
BILIRUBIN DIRECT+TOT PNL SERPL-MCNC: 0.2 MG/DL (ref 0–0.8)
BILIRUBIN DIRECT+TOT PNL SERPL-MCNC: 0.5 MG/DL (ref 0–0.5)
BUN SERPL-MCNC: 6.7 MG/DL (ref 8.4–25.7)
BUN SERPL-MCNC: 7.9 MG/DL (ref 8.4–25.7)
CALCIUM SERPL-MCNC: 9.4 MG/DL (ref 8.8–10)
CALCIUM SERPL-MCNC: 9.6 MG/DL (ref 8.8–10)
CHLORIDE SERPL-SCNC: 95 MMOL/L (ref 98–107)
CHLORIDE SERPL-SCNC: 96 MMOL/L (ref 98–107)
CO2 SERPL-SCNC: 25 MMOL/L (ref 23–31)
CO2 SERPL-SCNC: 26 MMOL/L (ref 23–31)
CREAT SERPL-MCNC: 0.63 MG/DL (ref 0.73–1.18)
CREAT SERPL-MCNC: 0.64 MG/DL (ref 0.73–1.18)
CREAT/UREA NIT SERPL: 11
CROSSMATCH INTERPRETATION: NORMAL
EOSINOPHIL # BLD AUTO: 0 X10(3)/MCL (ref 0–0.9)
EOSINOPHIL NFR BLD AUTO: 0 %
ERYTHROCYTE [DISTWIDTH] IN BLOOD BY AUTOMATED COUNT: 15 % (ref 11.5–14.5)
FINAL CULTURE: NORMAL
FINAL CULTURE: NORMAL
GAMMA GLOBULIN % (OHS): 14.22 % (ref 10.1–21.9)
GAMMA GLOBULIN (OHS): 0.84 GM/DL (ref 0.4–1.8)
GLOBULIN SER-MCNC: 3.8 GM/DL (ref 2.4–3.5)
GLOBULIN SER-MCNC: 4.2 GM/DL (ref 2.4–3.5)
GLUCOSE SERPL-MCNC: 110 MG/DL (ref 82–115)
GLUCOSE SERPL-MCNC: 131 MG/DL (ref 82–115)
HCT VFR BLD AUTO: 22.5 % (ref 40–51)
HCT VFR BLD AUTO: 28.1 % (ref 40–51)
HGB BLD-MCNC: 6.9 GM/DL (ref 13.5–17.5)
HGB BLD-MCNC: 9.3 GM/DL (ref 13.5–17.5)
IMM GRANULOCYTES # BLD AUTO: 0.21 10*3/UL
IMM GRANULOCYTES NFR BLD AUTO: 1 %
LYMPHOCYTES # BLD AUTO: 0.6 X10(3)/MCL (ref 0.6–4.6)
LYMPHOCYTES NFR BLD AUTO: 2 %
M SPIKE % (OHS): ABNORMAL
M SPIKE (OHS): ABNORMAL
MAGNESIUM SERPL-MCNC: 1.6 MG/DL (ref 1.6–2.6)
MAGNESIUM SERPL-MCNC: 2.3 MG/DL (ref 1.6–2.6)
MCH RBC QN AUTO: 29.4 PG (ref 26–34)
MCHC RBC AUTO-ENTMCNC: 30.7 GM/DL (ref 31–37)
MCV RBC AUTO: 95.7 FL (ref 80–100)
MONOCYTES # BLD AUTO: 2.1 X10(3)/MCL (ref 0.1–1.3)
MONOCYTES NFR BLD AUTO: 9 %
NEUTROPHILS # BLD AUTO: 20.42 X10(3)/MCL (ref 2.1–9.2)
NEUTROPHILS NFR BLD AUTO: 87 %
NRBC BLD AUTO-RTO: 0 % (ref 0–0.2)
PHOSPHATE SERPL-MCNC: 1.6 MG/DL (ref 2.3–4.7)
PHOSPHATE SERPL-MCNC: 3.3 MG/DL (ref 2.3–4.7)
PLATELET # BLD AUTO: 532 X10(3)/MCL (ref 130–400)
PMV BLD AUTO: 9 FL (ref 7.4–10.4)
POTASSIUM SERPL-SCNC: 3.1 MMOL/L (ref 3.5–5.1)
POTASSIUM SERPL-SCNC: 3.5 MMOL/L (ref 3.5–5.1)
PRODUCT READY: NORMAL
PROT SERPL-MCNC: 5.9 GM/DL (ref 5.8–7.6)
PROT SERPL-MCNC: 6.1 GM/DL (ref 5.8–7.6)
RBC # BLD AUTO: 2.35 X10(6)/MCL (ref 4.5–5.9)
SODIUM SERPL-SCNC: 129 MMOL/L (ref 136–145)
SODIUM SERPL-SCNC: 133 MMOL/L (ref 136–145)
SPE INTERPRETATION (OHS): ABNORMAL
TRANSFUSION ORDER: NORMAL
WBC # SPEC AUTO: 23.4 X10(3)/MCL (ref 4.5–11)

## 2021-09-23 LAB
ABS NEUT (OLG): 18.82 X10(3)/MCL (ref 2.1–9.2)
ALBUMIN SERPL-MCNC: 1.8 GM/DL (ref 3.4–4.8)
ALBUMIN/GLOB SERPL: 0.4 RATIO (ref 1.1–2)
ALP SERPL-CCNC: 76 UNIT/L (ref 40–150)
ALT SERPL-CCNC: 18 UNIT/L (ref 0–55)
AST SERPL-CCNC: 40 UNIT/L (ref 5–34)
BASOPHILS # BLD AUTO: 0 X10(3)/MCL (ref 0–0.2)
BASOPHILS NFR BLD AUTO: 0 %
BILIRUB SERPL-MCNC: 1.3 MG/DL
BILIRUBIN DIRECT+TOT PNL SERPL-MCNC: 0.4 MG/DL (ref 0–0.8)
BILIRUBIN DIRECT+TOT PNL SERPL-MCNC: 0.9 MG/DL (ref 0–0.5)
BUN SERPL-MCNC: 9.5 MG/DL (ref 8.4–25.7)
CALCIUM SERPL-MCNC: 8.8 MG/DL (ref 8.8–10)
CHLORIDE SERPL-SCNC: 97 MMOL/L (ref 98–107)
CO2 SERPL-SCNC: 26 MMOL/L (ref 23–31)
CREAT SERPL-MCNC: 0.71 MG/DL (ref 0.73–1.18)
EOSINOPHIL # BLD AUTO: 0 X10(3)/MCL (ref 0–0.9)
EOSINOPHIL NFR BLD AUTO: 0 %
ERYTHROCYTE [DISTWIDTH] IN BLOOD BY AUTOMATED COUNT: 15.1 % (ref 11.5–14.5)
GLOBULIN SER-MCNC: 4.4 GM/DL (ref 2.4–3.5)
GLUCOSE SERPL-MCNC: 130 MG/DL (ref 82–115)
HCT VFR BLD AUTO: 28.7 % (ref 40–51)
HGB BLD-MCNC: 9.2 GM/DL (ref 13.5–17.5)
IMM GRANULOCYTES # BLD AUTO: 0.14 10*3/UL
IMM GRANULOCYTES NFR BLD AUTO: 1 %
INR PPP: 1.46 (ref 0.9–1.2)
LYMPHOCYTES # BLD AUTO: 0.4 X10(3)/MCL (ref 0.6–4.6)
LYMPHOCYTES NFR BLD AUTO: 2 %
MAGNESIUM SERPL-MCNC: 2.3 MG/DL (ref 1.6–2.6)
MCH RBC QN AUTO: 29.5 PG (ref 26–34)
MCHC RBC AUTO-ENTMCNC: 32.1 GM/DL (ref 31–37)
MCV RBC AUTO: 92 FL (ref 80–100)
MONOCYTES # BLD AUTO: 0.5 X10(3)/MCL (ref 0.1–1.3)
MONOCYTES NFR BLD AUTO: 3 %
NEUTROPHILS # BLD AUTO: 18.82 X10(3)/MCL (ref 2.1–9.2)
NEUTROPHILS NFR BLD AUTO: 94 %
NRBC BLD AUTO-RTO: 0 % (ref 0–0.2)
PHOSPHATE SERPL-MCNC: 3 MG/DL (ref 2.3–4.7)
PLATELET # BLD AUTO: 479 X10(3)/MCL (ref 130–400)
PMV BLD AUTO: 8.7 FL (ref 7.4–10.4)
POTASSIUM SERPL-SCNC: 3.4 MMOL/L (ref 3.5–5.1)
PROT SERPL-MCNC: 6.2 GM/DL (ref 5.8–7.6)
PROTHROMBIN TIME: 17.5 SECOND(S) (ref 11.9–14.4)
RBC # BLD AUTO: 3.12 X10(6)/MCL (ref 4.5–5.9)
SODIUM SERPL-SCNC: 131 MMOL/L (ref 136–145)
WBC # SPEC AUTO: 19.9 X10(3)/MCL (ref 4.5–11)

## 2021-09-24 LAB
ABS NEUT (OLG): 17.32 X10(3)/MCL (ref 2.1–9.2)
ALBUMIN SERPL-MCNC: 1.8 GM/DL (ref 3.4–4.8)
ALBUMIN/GLOB SERPL: 0.4 RATIO (ref 1.1–2)
ALP SERPL-CCNC: 76 UNIT/L (ref 40–150)
ALT SERPL-CCNC: 21 UNIT/L (ref 0–55)
AST SERPL-CCNC: 52 UNIT/L (ref 5–34)
BASOPHILS # BLD AUTO: 0 X10(3)/MCL (ref 0–0.2)
BASOPHILS NFR BLD AUTO: 0 %
BILIRUB SERPL-MCNC: 0.7 MG/DL
BILIRUBIN DIRECT+TOT PNL SERPL-MCNC: 0.2 MG/DL (ref 0–0.8)
BILIRUBIN DIRECT+TOT PNL SERPL-MCNC: 0.5 MG/DL (ref 0–0.5)
BUN SERPL-MCNC: 14.3 MG/DL (ref 8.4–25.7)
CALCIUM SERPL-MCNC: 8.7 MG/DL (ref 8.8–10)
CHLORIDE SERPL-SCNC: 98 MMOL/L (ref 98–107)
CO2 SERPL-SCNC: 25 MMOL/L (ref 23–31)
CREAT SERPL-MCNC: 0.71 MG/DL (ref 0.73–1.18)
ERYTHROCYTE [DISTWIDTH] IN BLOOD BY AUTOMATED COUNT: 15.3 % (ref 11.5–14.5)
GLOBULIN SER-MCNC: 4.6 GM/DL (ref 2.4–3.5)
GLUCOSE SERPL-MCNC: 153 MG/DL (ref 82–115)
HCT VFR BLD AUTO: 30 % (ref 40–51)
HGB BLD-MCNC: 9.5 GM/DL (ref 13.5–17.5)
IMM GRANULOCYTES # BLD AUTO: 0.11 10*3/UL
IMM GRANULOCYTES NFR BLD AUTO: 1 %
LYMPHOCYTES # BLD AUTO: 0.3 X10(3)/MCL (ref 0.6–4.6)
LYMPHOCYTES NFR BLD AUTO: 2 %
MAGNESIUM SERPL-MCNC: 2 MG/DL (ref 1.6–2.6)
MCH RBC QN AUTO: 28.9 PG (ref 26–34)
MCHC RBC AUTO-ENTMCNC: 31.7 GM/DL (ref 31–37)
MCV RBC AUTO: 91.2 FL (ref 80–100)
MONOCYTES # BLD AUTO: 0.8 X10(3)/MCL (ref 0.1–1.3)
MONOCYTES NFR BLD AUTO: 4 %
NEUTROPHILS # BLD AUTO: 17.32 X10(3)/MCL (ref 2.1–9.2)
NEUTROPHILS NFR BLD AUTO: 93 %
NRBC BLD AUTO-RTO: 0 % (ref 0–0.2)
PHOSPHATE SERPL-MCNC: 1.7 MG/DL (ref 2.3–4.7)
PLATELET # BLD AUTO: 569 X10(3)/MCL (ref 130–400)
PMV BLD AUTO: 8.6 FL (ref 7.4–10.4)
POTASSIUM SERPL-SCNC: 3.5 MMOL/L (ref 3.5–5.1)
PROT SERPL-MCNC: 6.4 GM/DL (ref 5.8–7.6)
RBC # BLD AUTO: 3.29 X10(6)/MCL (ref 4.5–5.9)
SODIUM SERPL-SCNC: 133 MMOL/L (ref 136–145)
WBC # SPEC AUTO: 18.6 X10(3)/MCL (ref 4.5–11)

## 2021-09-25 LAB
ABS NEUT (OLG): 19.29 X10(3)/MCL (ref 2.1–9.2)
ALBUMIN SERPL-MCNC: 1.9 GM/DL (ref 3.4–4.8)
ALBUMIN/GLOB SERPL: 0.4 RATIO (ref 1.1–2)
ALP SERPL-CCNC: 72 UNIT/L (ref 40–150)
ALT SERPL-CCNC: 24 UNIT/L (ref 0–55)
AST SERPL-CCNC: 56 UNIT/L (ref 5–34)
BASOPHILS # BLD AUTO: 0 X10(3)/MCL (ref 0–0.2)
BASOPHILS NFR BLD AUTO: 0 %
BILIRUB SERPL-MCNC: 0.5 MG/DL
BILIRUBIN DIRECT+TOT PNL SERPL-MCNC: 0.1 MG/DL (ref 0–0.8)
BILIRUBIN DIRECT+TOT PNL SERPL-MCNC: 0.4 MG/DL (ref 0–0.5)
BUN SERPL-MCNC: 21.5 MG/DL (ref 8.4–25.7)
CALCIUM SERPL-MCNC: 8.1 MG/DL (ref 8.8–10)
CHLORIDE SERPL-SCNC: 96 MMOL/L (ref 98–107)
CO2 SERPL-SCNC: 27 MMOL/L (ref 23–31)
CREAT SERPL-MCNC: 0.82 MG/DL (ref 0.73–1.18)
ERYTHROCYTE [DISTWIDTH] IN BLOOD BY AUTOMATED COUNT: 15.4 % (ref 11.5–14.5)
GLOBULIN SER-MCNC: 4.6 GM/DL (ref 2.4–3.5)
GLUCOSE SERPL-MCNC: 112 MG/DL (ref 82–115)
HCT VFR BLD AUTO: 31 % (ref 40–51)
HGB BLD-MCNC: 9.8 GM/DL (ref 13.5–17.5)
IMM GRANULOCYTES # BLD AUTO: 0.14 10*3/UL
IMM GRANULOCYTES NFR BLD AUTO: 1 %
LYMPHOCYTES # BLD AUTO: 0.3 X10(3)/MCL (ref 0.6–4.6)
LYMPHOCYTES NFR BLD AUTO: 2 %
MAGNESIUM SERPL-MCNC: 2.2 MG/DL (ref 1.6–2.6)
MCH RBC QN AUTO: 29.3 PG (ref 26–34)
MCHC RBC AUTO-ENTMCNC: 31.6 GM/DL (ref 31–37)
MCV RBC AUTO: 92.8 FL (ref 80–100)
MONOCYTES # BLD AUTO: 1.2 X10(3)/MCL (ref 0.1–1.3)
MONOCYTES NFR BLD AUTO: 6 %
NEUTROPHILS # BLD AUTO: 19.29 X10(3)/MCL (ref 2.1–9.2)
NEUTROPHILS NFR BLD AUTO: 92 %
NRBC BLD AUTO-RTO: 0 % (ref 0–0.2)
PHOSPHATE SERPL-MCNC: 2.8 MG/DL (ref 2.3–4.7)
PLATELET # BLD AUTO: 566 X10(3)/MCL (ref 130–400)
PMV BLD AUTO: 8.8 FL (ref 7.4–10.4)
POTASSIUM SERPL-SCNC: 4 MMOL/L (ref 3.5–5.1)
PROT SERPL-MCNC: 6.5 GM/DL (ref 5.8–7.6)
RBC # BLD AUTO: 3.34 X10(6)/MCL (ref 4.5–5.9)
SODIUM SERPL-SCNC: 134 MMOL/L (ref 136–145)
WBC # SPEC AUTO: 21 X10(3)/MCL (ref 4.5–11)

## 2021-09-26 LAB
ABS NEUT (OLG): 19.46 X10(3)/MCL (ref 2.1–9.2)
ALBUMIN SERPL-MCNC: 2 GM/DL (ref 3.4–4.8)
ALBUMIN/GLOB SERPL: 0.5 RATIO (ref 1.1–2)
ALP SERPL-CCNC: 69 UNIT/L (ref 40–150)
ALT SERPL-CCNC: 24 UNIT/L (ref 0–55)
AST SERPL-CCNC: 46 UNIT/L (ref 5–34)
BASOPHILS # BLD AUTO: 0 X10(3)/MCL (ref 0–0.2)
BASOPHILS NFR BLD AUTO: 0 %
BILIRUB SERPL-MCNC: 0.5 MG/DL
BILIRUBIN DIRECT+TOT PNL SERPL-MCNC: 0.2 MG/DL (ref 0–0.8)
BILIRUBIN DIRECT+TOT PNL SERPL-MCNC: 0.3 MG/DL (ref 0–0.5)
BUN SERPL-MCNC: 21.6 MG/DL (ref 8.4–25.7)
CALCIUM SERPL-MCNC: 7.9 MG/DL (ref 8.8–10)
CHLORIDE SERPL-SCNC: 97 MMOL/L (ref 98–107)
CO2 SERPL-SCNC: 25 MMOL/L (ref 23–31)
CREAT SERPL-MCNC: 0.74 MG/DL (ref 0.73–1.18)
ERYTHROCYTE [DISTWIDTH] IN BLOOD BY AUTOMATED COUNT: 15.4 % (ref 11.5–14.5)
GLOBULIN SER-MCNC: 4.4 GM/DL (ref 2.4–3.5)
GLUCOSE SERPL-MCNC: 115 MG/DL (ref 82–115)
HCT VFR BLD AUTO: 31.2 % (ref 40–51)
HGB BLD-MCNC: 10 GM/DL (ref 13.5–17.5)
IMM GRANULOCYTES # BLD AUTO: 0.14 10*3/UL
IMM GRANULOCYTES NFR BLD AUTO: 1 %
LYMPHOCYTES # BLD AUTO: 0.4 X10(3)/MCL (ref 0.6–4.6)
LYMPHOCYTES NFR BLD AUTO: 2 %
MAGNESIUM SERPL-MCNC: 2 MG/DL (ref 1.6–2.6)
MCH RBC QN AUTO: 29.7 PG (ref 26–34)
MCHC RBC AUTO-ENTMCNC: 32.1 GM/DL (ref 31–37)
MCV RBC AUTO: 92.6 FL (ref 80–100)
MONOCYTES # BLD AUTO: 1.2 X10(3)/MCL (ref 0.1–1.3)
MONOCYTES NFR BLD AUTO: 6 %
NEUTROPHILS # BLD AUTO: 19.46 X10(3)/MCL (ref 2.1–9.2)
NEUTROPHILS NFR BLD AUTO: 92 %
NRBC BLD AUTO-RTO: 0 % (ref 0–0.2)
PHOSPHATE SERPL-MCNC: 1.9 MG/DL (ref 2.3–4.7)
PLATELET # BLD AUTO: 557 X10(3)/MCL (ref 130–400)
PMV BLD AUTO: 8.7 FL (ref 7.4–10.4)
POTASSIUM SERPL-SCNC: 4.1 MMOL/L (ref 3.5–5.1)
PROT SERPL-MCNC: 6.4 GM/DL (ref 5.8–7.6)
RBC # BLD AUTO: 3.37 X10(6)/MCL (ref 4.5–5.9)
SODIUM SERPL-SCNC: 134 MMOL/L (ref 136–145)
WBC # SPEC AUTO: 21.2 X10(3)/MCL (ref 4.5–11)

## 2021-09-27 LAB
ABS NEUT (OLG): 26.29 X10(3)/MCL (ref 2.1–9.2)
ALBUMIN SERPL-MCNC: 1.9 GM/DL (ref 3.4–4.8)
ALBUMIN/GLOB SERPL: 0.4 RATIO (ref 1.1–2)
ALP SERPL-CCNC: 69 UNIT/L (ref 40–150)
ALT SERPL-CCNC: 22 UNIT/L (ref 0–55)
AST SERPL-CCNC: 33 UNIT/L (ref 5–34)
BASOPHILS # BLD AUTO: 0 X10(3)/MCL (ref 0–0.2)
BASOPHILS NFR BLD AUTO: 0 %
BILIRUB SERPL-MCNC: 0.5 MG/DL
BILIRUBIN DIRECT+TOT PNL SERPL-MCNC: 0.1 MG/DL (ref 0–0.8)
BILIRUBIN DIRECT+TOT PNL SERPL-MCNC: 0.4 MG/DL (ref 0–0.5)
BUN SERPL-MCNC: 17.6 MG/DL (ref 8.4–25.7)
CALCIUM SERPL-MCNC: 7.9 MG/DL (ref 8.8–10)
CHLORIDE SERPL-SCNC: 100 MMOL/L (ref 98–107)
CHOLEST FLD-MCNC: 35 MG/DL
CO2 SERPL-SCNC: 26 MMOL/L (ref 23–31)
CREAT SERPL-MCNC: 0.74 MG/DL (ref 0.73–1.18)
ERYTHROCYTE [DISTWIDTH] IN BLOOD BY AUTOMATED COUNT: 15.4 % (ref 11.5–14.5)
GLOBULIN SER-MCNC: 4.4 GM/DL (ref 2.4–3.5)
GLUCOSE SERPL-MCNC: 82 MG/DL (ref 82–115)
HCT VFR BLD AUTO: 33.5 % (ref 40–51)
HGB BLD-MCNC: 10.6 GM/DL (ref 13.5–17.5)
IMM GRANULOCYTES # BLD AUTO: 0.28 10*3/UL
IMM GRANULOCYTES NFR BLD AUTO: 1 %
LDH FLD-CCNC: 566 U/L
LDH SERPL-CCNC: 517 UNIT/L (ref 140–271)
LYMPHOCYTES # BLD AUTO: 0.6 X10(3)/MCL (ref 0.6–4.6)
LYMPHOCYTES NFR BLD AUTO: 2 %
MAGNESIUM SERPL-MCNC: 2.1 MG/DL (ref 1.6–2.6)
MCH RBC QN AUTO: 29.4 PG (ref 26–34)
MCHC RBC AUTO-ENTMCNC: 31.6 GM/DL (ref 31–37)
MCV RBC AUTO: 93.1 FL (ref 80–100)
MONOCYTES # BLD AUTO: 2 X10(3)/MCL (ref 0.1–1.3)
MONOCYTES NFR BLD AUTO: 7 %
NEUTROPHILS # BLD AUTO: 26.29 X10(3)/MCL (ref 2.1–9.2)
NEUTROPHILS NFR BLD AUTO: 90 %
NRBC BLD AUTO-RTO: 0 % (ref 0–0.2)
PHOSPHATE SERPL-MCNC: 2.3 MG/DL (ref 2.3–4.7)
PLATELET # BLD AUTO: 554 X10(3)/MCL (ref 130–400)
PMV BLD AUTO: 8.6 FL (ref 7.4–10.4)
POTASSIUM SERPL-SCNC: 3.9 MMOL/L (ref 3.5–5.1)
PROT FLD-MCNC: 2.5 GM/DL
PROT SERPL-MCNC: 6.3 GM/DL (ref 5.8–7.6)
RBC # BLD AUTO: 3.6 X10(6)/MCL (ref 4.5–5.9)
SODIUM SERPL-SCNC: 136 MMOL/L (ref 136–145)
WBC # SPEC AUTO: 29.3 X10(3)/MCL (ref 4.5–11)

## 2021-09-28 LAB
ABS NEUT (OLG): 26.36 X10(3)/MCL (ref 2.1–9.2)
ALBUMIN SERPL-MCNC: 1.7 GM/DL (ref 3.4–4.8)
ALBUMIN/GLOB SERPL: 0.4 RATIO (ref 1.1–2)
ALP SERPL-CCNC: 63 UNIT/L (ref 40–150)
ALT SERPL-CCNC: 16 UNIT/L (ref 0–55)
AST SERPL-CCNC: 24 UNIT/L (ref 5–34)
BASOPHILS NFR BLD MANUAL: 0 %
BILIRUB SERPL-MCNC: 0.5 MG/DL
BILIRUBIN DIRECT+TOT PNL SERPL-MCNC: 0.1 MG/DL (ref 0–0.8)
BILIRUBIN DIRECT+TOT PNL SERPL-MCNC: 0.4 MG/DL (ref 0–0.5)
BUN SERPL-MCNC: 20.4 MG/DL (ref 8.4–25.7)
BURR CELLS BLD QL SMEAR: ABNORMAL
CALCIUM SERPL-MCNC: 7.6 MG/DL (ref 8.8–10)
CHLORIDE SERPL-SCNC: 101 MMOL/L (ref 98–107)
CO2 SERPL-SCNC: 26 MMOL/L (ref 23–31)
CREAT SERPL-MCNC: 0.76 MG/DL (ref 0.73–1.18)
EOSINOPHIL NFR BLD MANUAL: 1 %
ERYTHROCYTE [DISTWIDTH] IN BLOOD BY AUTOMATED COUNT: 15.5 % (ref 11.5–14.5)
GLOBULIN SER-MCNC: 3.9 GM/DL (ref 2.4–3.5)
GLUCOSE SERPL-MCNC: 80 MG/DL (ref 82–115)
GRANULOCYTES NFR BLD MANUAL: 96 % (ref 43–75)
HCT VFR BLD AUTO: 30.8 % (ref 40–51)
HGB BLD-MCNC: 9.7 GM/DL (ref 13.5–17.5)
LYMPHOCYTES NFR BLD MANUAL: 1 % (ref 20.5–51.1)
MACROCYTES BLD QL SMEAR: ABNORMAL
MAGNESIUM SERPL-MCNC: 1.9 MG/DL (ref 1.6–2.6)
MCH RBC QN AUTO: 29.8 PG (ref 26–34)
MCHC RBC AUTO-ENTMCNC: 31.5 GM/DL (ref 31–37)
MCV RBC AUTO: 94.8 FL (ref 80–100)
MONOCYTES NFR BLD MANUAL: 1 % (ref 2–9)
NEUTS BAND NFR BLD MANUAL: 1 % (ref 0–10)
OVALOCYTES BLD QL SMEAR: ABNORMAL
PH BF TYPE: NORMAL
PH FLD: 8.5 [PH]
PHOSPHATE SERPL-MCNC: 2.3 MG/DL (ref 2.3–4.7)
PLATELET # BLD AUTO: 433 X10(3)/MCL (ref 130–400)
PLATELET # BLD EST: ABNORMAL 10*3/UL
PMV BLD AUTO: 8.9 FL (ref 7.4–10.4)
POIKILOCYTOSIS BLD QL SMEAR: ABNORMAL
POTASSIUM SERPL-SCNC: 3.9 MMOL/L (ref 3.5–5.1)
PROT SERPL-MCNC: 5.6 GM/DL (ref 5.8–7.6)
RBC # BLD AUTO: 3.25 X10(6)/MCL (ref 4.5–5.9)
RBC MORPH BLD: ABNORMAL
SODIUM SERPL-SCNC: 136 MMOL/L (ref 136–145)
WBC # SPEC AUTO: 28.9 X10(3)/MCL (ref 4.5–11)

## 2021-09-29 LAB
ABS NEUT (OLG): 29.2 X10(3)/MCL (ref 2.1–9.2)
ALBUMIN SERPL-MCNC: 1.7 GM/DL (ref 3.4–4.8)
ALBUMIN/GLOB SERPL: 0.4 RATIO (ref 1.1–2)
ALP SERPL-CCNC: 74 UNIT/L (ref 40–150)
ALT SERPL-CCNC: 16 UNIT/L (ref 0–55)
AST SERPL-CCNC: 28 UNIT/L (ref 5–34)
BASOPHILS # BLD AUTO: 0 X10(3)/MCL (ref 0–0.2)
BASOPHILS NFR BLD AUTO: 0 %
BILIRUB SERPL-MCNC: 0.7 MG/DL
BILIRUBIN DIRECT+TOT PNL SERPL-MCNC: 0.2 MG/DL (ref 0–0.8)
BILIRUBIN DIRECT+TOT PNL SERPL-MCNC: 0.5 MG/DL (ref 0–0.5)
BUN SERPL-MCNC: 17.8 MG/DL (ref 8.4–25.7)
CALCIUM SERPL-MCNC: 7.8 MG/DL (ref 8.8–10)
CHLORIDE SERPL-SCNC: 101 MMOL/L (ref 98–107)
CO2 SERPL-SCNC: 22 MMOL/L (ref 23–31)
CREAT SERPL-MCNC: 0.69 MG/DL (ref 0.73–1.18)
EOSINOPHIL # BLD AUTO: 0 X10(3)/MCL (ref 0–0.9)
EOSINOPHIL NFR BLD AUTO: 0 %
ERYTHROCYTE [DISTWIDTH] IN BLOOD BY AUTOMATED COUNT: 15.8 % (ref 11.5–14.5)
FLUAV AG UPPER RESP QL IA.RAPID: NOT DETECTED
FLUBV AG UPPER RESP QL IA.RAPID: NOT DETECTED
GLOBULIN SER-MCNC: 4.4 GM/DL (ref 2.4–3.5)
GLUCOSE SERPL-MCNC: 83 MG/DL (ref 82–115)
HCT VFR BLD AUTO: 31.8 % (ref 40–51)
HGB BLD-MCNC: 9.9 GM/DL (ref 13.5–17.5)
IMM GRANULOCYTES # BLD AUTO: 0.31 10*3/UL
IMM GRANULOCYTES NFR BLD AUTO: 1 %
LYMPHOCYTES # BLD AUTO: 0.7 X10(3)/MCL (ref 0.6–4.6)
LYMPHOCYTES NFR BLD AUTO: 2 %
MAGNESIUM SERPL-MCNC: 1.9 MG/DL (ref 1.6–2.6)
MCH RBC QN AUTO: 29.2 PG (ref 26–34)
MCHC RBC AUTO-ENTMCNC: 31.1 GM/DL (ref 31–37)
MCV RBC AUTO: 93.8 FL (ref 80–100)
MONOCYTES # BLD AUTO: 2 X10(3)/MCL (ref 0.1–1.3)
MONOCYTES NFR BLD AUTO: 6 %
MRSA SCREEN BY PCR: NEGATIVE
NEUTROPHILS # BLD AUTO: 29.2 X10(3)/MCL (ref 2.1–9.2)
NEUTROPHILS NFR BLD AUTO: 90 %
NRBC BLD AUTO-RTO: 0 % (ref 0–0.2)
PHOSPHATE SERPL-MCNC: 2.1 MG/DL (ref 2.3–4.7)
PLATELET # BLD AUTO: 413 X10(3)/MCL (ref 130–400)
PMV BLD AUTO: 9 FL (ref 7.4–10.4)
POTASSIUM SERPL-SCNC: 3.8 MMOL/L (ref 3.5–5.1)
PROT SERPL-MCNC: 6.1 GM/DL (ref 5.8–7.6)
RBC # BLD AUTO: 3.39 X10(6)/MCL (ref 4.5–5.9)
SODIUM SERPL-SCNC: 134 MMOL/L (ref 136–145)
WBC # SPEC AUTO: 32.3 X10(3)/MCL (ref 4.5–11)

## 2021-09-30 LAB
ABS NEUT (OLG): 24.55 X10(3)/MCL (ref 2.1–9.2)
ALBUMIN SERPL-MCNC: 1.6 GM/DL (ref 3.4–4.8)
ALBUMIN/GLOB SERPL: 0.4 RATIO (ref 1.1–2)
ALP SERPL-CCNC: 71 UNIT/L (ref 40–150)
ALT SERPL-CCNC: 13 UNIT/L (ref 0–55)
ANISOCYTOSIS BLD QL SMEAR: ABNORMAL
AST SERPL-CCNC: 30 UNIT/L (ref 5–34)
BASOPHILS NFR BLD MANUAL: 0 %
BILIRUB SERPL-MCNC: 0.8 MG/DL
BILIRUBIN DIRECT+TOT PNL SERPL-MCNC: 0.3 MG/DL (ref 0–0.8)
BILIRUBIN DIRECT+TOT PNL SERPL-MCNC: 0.5 MG/DL (ref 0–0.5)
BUN SERPL-MCNC: 14.1 MG/DL (ref 8.4–25.7)
CALCIUM SERPL-MCNC: 7.9 MG/DL (ref 8.8–10)
CHLORIDE SERPL-SCNC: 101 MMOL/L (ref 98–107)
CO2 SERPL-SCNC: 20 MMOL/L (ref 23–31)
CREAT SERPL-MCNC: 0.67 MG/DL (ref 0.73–1.18)
EOSINOPHIL NFR BLD MANUAL: 1 %
ERYTHROCYTE [DISTWIDTH] IN BLOOD BY AUTOMATED COUNT: 15.8 % (ref 11.5–14.5)
GLOBULIN SER-MCNC: 4 GM/DL (ref 2.4–3.5)
GLUCOSE SERPL-MCNC: 70 MG/DL (ref 82–115)
GRANULOCYTES NFR BLD MANUAL: 94 % (ref 43–75)
HCT VFR BLD AUTO: 27.7 % (ref 40–51)
HGB BLD-MCNC: 8.8 GM/DL (ref 13.5–17.5)
LACTATE SERPL-SCNC: 1.8 MMOL/L (ref 0.5–2.2)
LYMPHOCYTES NFR BLD MANUAL: 1 % (ref 20.5–51.1)
MAGNESIUM SERPL-MCNC: 1.7 MG/DL (ref 1.6–2.6)
MCH RBC QN AUTO: 29.6 PG (ref 26–34)
MCHC RBC AUTO-ENTMCNC: 31.8 GM/DL (ref 31–37)
MCV RBC AUTO: 93.3 FL (ref 80–100)
MONOCYTES NFR BLD MANUAL: 4 % (ref 2–9)
PHOSPHATE SERPL-MCNC: 1.7 MG/DL (ref 2.3–4.7)
PLATELET # BLD AUTO: 354 X10(3)/MCL (ref 130–400)
PLATELET # BLD EST: NORMAL 10*3/UL
PMV BLD AUTO: 9.2 FL (ref 7.4–10.4)
POIKILOCYTOSIS BLD QL SMEAR: ABNORMAL
POLYCHROMASIA BLD QL SMEAR: ABNORMAL
POTASSIUM SERPL-SCNC: 3.7 MMOL/L (ref 3.5–5.1)
PROT SERPL-MCNC: 5.6 GM/DL (ref 5.8–7.6)
RBC # BLD AUTO: 2.97 X10(6)/MCL (ref 4.5–5.9)
RBC MORPH BLD: ABNORMAL
SCHISTOCYTES BLD QL AUTO: ABNORMAL
SODIUM SERPL-SCNC: 133 MMOL/L (ref 136–145)
WBC # SPEC AUTO: 27.3 X10(3)/MCL (ref 4.5–11)

## 2021-10-01 LAB
ABS NEUT (OLG): 21.04 X10(3)/MCL (ref 2.1–9.2)
ALBUMIN SERPL-MCNC: 1.6 GM/DL (ref 3.4–4.8)
ALBUMIN/GLOB SERPL: 0.4 RATIO (ref 1.1–2)
ALP SERPL-CCNC: 80 UNIT/L (ref 40–150)
ALT SERPL-CCNC: 14 UNIT/L (ref 0–55)
AST SERPL-CCNC: 29 UNIT/L (ref 5–34)
BASOPHILS # BLD AUTO: 0 X10(3)/MCL (ref 0–0.2)
BASOPHILS NFR BLD AUTO: 0 %
BILIRUB SERPL-MCNC: 0.7 MG/DL
BILIRUBIN DIRECT+TOT PNL SERPL-MCNC: 0.2 MG/DL (ref 0–0.8)
BILIRUBIN DIRECT+TOT PNL SERPL-MCNC: 0.5 MG/DL (ref 0–0.5)
BUN SERPL-MCNC: 12.8 MG/DL (ref 8.4–25.7)
CALCIUM SERPL-MCNC: 8.3 MG/DL (ref 8.8–10)
CHLORIDE SERPL-SCNC: 99 MMOL/L (ref 98–107)
CO2 SERPL-SCNC: 23 MMOL/L (ref 23–31)
CREAT SERPL-MCNC: 0.69 MG/DL (ref 0.73–1.18)
EOSINOPHIL # BLD AUTO: 0 X10(3)/MCL (ref 0–0.9)
EOSINOPHIL NFR BLD AUTO: 0 %
ERYTHROCYTE [DISTWIDTH] IN BLOOD BY AUTOMATED COUNT: 15.6 % (ref 11.5–14.5)
EST CREAT CLEARANCE SER (OHS): 111.31 ML/MIN
GLOBULIN SER-MCNC: 4.3 GM/DL (ref 2.4–3.5)
GLUCOSE SERPL-MCNC: 83 MG/DL (ref 82–115)
HCT VFR BLD AUTO: 26.6 % (ref 40–51)
HGB BLD-MCNC: 8.3 GM/DL (ref 13.5–17.5)
IMM GRANULOCYTES # BLD AUTO: 0.21 10*3/UL
IMM GRANULOCYTES NFR BLD AUTO: 1 %
LYMPHOCYTES # BLD AUTO: 0.4 X10(3)/MCL (ref 0.6–4.6)
LYMPHOCYTES NFR BLD AUTO: 2 %
MAGNESIUM SERPL-MCNC: 1.8 MG/DL (ref 1.6–2.6)
MCH RBC QN AUTO: 28.9 PG (ref 26–34)
MCHC RBC AUTO-ENTMCNC: 31.2 GM/DL (ref 31–37)
MCV RBC AUTO: 92.7 FL (ref 80–100)
MONOCYTES # BLD AUTO: 2.1 X10(3)/MCL (ref 0.1–1.3)
MONOCYTES NFR BLD AUTO: 9 %
NEUTROPHILS # BLD AUTO: 21.04 X10(3)/MCL (ref 2.1–9.2)
NEUTROPHILS NFR BLD AUTO: 88 %
NRBC BLD AUTO-RTO: 0 % (ref 0–0.2)
PHOSPHATE SERPL-MCNC: 2.1 MG/DL (ref 2.3–4.7)
PLATELET # BLD AUTO: 312 X10(3)/MCL (ref 130–400)
PMV BLD AUTO: 9.3 FL (ref 7.4–10.4)
POTASSIUM SERPL-SCNC: 3.2 MMOL/L (ref 3.5–5.1)
PROT SERPL-MCNC: 5.9 GM/DL (ref 5.8–7.6)
RBC # BLD AUTO: 2.87 X10(6)/MCL (ref 4.5–5.9)
SODIUM SERPL-SCNC: 133 MMOL/L (ref 136–145)
WBC # SPEC AUTO: 23.8 X10(3)/MCL (ref 4.5–11)

## 2021-10-03 LAB
ABS NEUT (OLG): 21.25 X10(3)/MCL (ref 2.1–9.2)
ALBUMIN SERPL-MCNC: 1.8 GM/DL (ref 3.4–4.8)
ALBUMIN/GLOB SERPL: 0.4 RATIO (ref 1.1–2)
ALP SERPL-CCNC: 91 UNIT/L (ref 40–150)
ALT SERPL-CCNC: 16 UNIT/L (ref 0–55)
AST SERPL-CCNC: 26 UNIT/L (ref 5–34)
BASOPHILS # BLD AUTO: 0 X10(3)/MCL (ref 0–0.2)
BASOPHILS NFR BLD AUTO: 0 %
BILIRUB SERPL-MCNC: 0.6 MG/DL
BILIRUBIN DIRECT+TOT PNL SERPL-MCNC: 0.2 MG/DL (ref 0–0.8)
BILIRUBIN DIRECT+TOT PNL SERPL-MCNC: 0.4 MG/DL (ref 0–0.5)
BUN SERPL-MCNC: 12.4 MG/DL (ref 8.4–25.7)
CALCIUM SERPL-MCNC: 9.3 MG/DL (ref 8.8–10)
CHLORIDE SERPL-SCNC: 96 MMOL/L (ref 98–107)
CO2 SERPL-SCNC: 24 MMOL/L (ref 23–31)
CREAT SERPL-MCNC: 0.57 MG/DL (ref 0.73–1.18)
CRP SERPL-MCNC: 25.99 MG/DL
EOSINOPHIL # BLD AUTO: 0 X10(3)/MCL (ref 0–0.9)
ERYTHROCYTE [DISTWIDTH] IN BLOOD BY AUTOMATED COUNT: 15.3 % (ref 11.5–14.5)
FLUAV AG UPPER RESP QL IA.RAPID: NOT DETECTED
FLUBV AG UPPER RESP QL IA.RAPID: NOT DETECTED
GLOBULIN SER-MCNC: 4.7 GM/DL (ref 2.4–3.5)
GLUCOSE SERPL-MCNC: 107 MG/DL (ref 82–115)
HCO3 UR-SCNC: 29.1 MMOL/L (ref 22–26)
HCT VFR BLD AUTO: 27.6 % (ref 40–51)
HGB BLD-MCNC: 8.5 GM/DL (ref 13.5–17.5)
IMM GRANULOCYTES # BLD AUTO: 0.19 10*3/UL
IMM GRANULOCYTES NFR BLD AUTO: 1 %
LACTATE SERPL-SCNC: 2.2 MMOL/L (ref 0.5–2.2)
LACTATE SERPL-SCNC: 2.4 MMOL/L (ref 0.5–2.2)
LYMPHOCYTES # BLD AUTO: 0.4 X10(3)/MCL (ref 0.6–4.6)
LYMPHOCYTES NFR BLD AUTO: 2 %
MCH RBC QN AUTO: 28.9 PG (ref 26–34)
MCHC RBC AUTO-ENTMCNC: 30.8 GM/DL (ref 31–37)
MCV RBC AUTO: 93.9 FL (ref 80–100)
MONOCYTES # BLD AUTO: 1.7 X10(3)/MCL (ref 0.1–1.3)
MONOCYTES NFR BLD AUTO: 7 %
MRSA SCREEN BY PCR: NEGATIVE
NEUTROPHILS # BLD AUTO: 21.25 X10(3)/MCL (ref 2.1–9.2)
NEUTROPHILS NFR BLD AUTO: 90 %
NRBC BLD AUTO-RTO: 0 % (ref 0–0.2)
O2 HGB ARTERIAL: 93.7 % (ref 94–100)
PCO2 BLDA: 47 MMHG (ref 35–45)
PH SMN: 7.4 [PH] (ref 7.35–7.45)
PLATELET # BLD AUTO: 296 X10(3)/MCL (ref 130–400)
PMV BLD AUTO: 9.1 FL (ref 7.4–10.4)
PO2 BLDA: 75 MMHG (ref 80–100)
POC ALLENS TEST: POSITIVE
POC BE: 3.6 (ref -2–2)
POC CO HGB: 1.9 %
POC CO2: 30.5 MMOL/L (ref 22–26)
POC MET HGB: 0.6 % (ref 0.4–1.5)
POC SAMPLESOURCE: ABNORMAL
POC SATURATED O2: 96.1 %
POC SITE: ABNORMAL
POC THB: 12 GM/DL (ref 12–16)
POC TREATMENT: ABNORMAL
POTASSIUM SERPL-SCNC: 3.7 MMOL/L (ref 3.5–5.1)
PROT SERPL-MCNC: 6.5 GM/DL (ref 5.8–7.6)
RBC # BLD AUTO: 2.94 X10(6)/MCL (ref 4.5–5.9)
SARS-COV-2 AG RESP QL IA.RAPID: NEGATIVE
SODIUM SERPL-SCNC: 134 MMOL/L (ref 136–145)
TROPONIN I SERPL-MCNC: <0.01 NG/ML (ref 0–0.04)
WBC # SPEC AUTO: 23.5 X10(3)/MCL (ref 4.5–11)

## 2021-10-04 ENCOUNTER — HISTORICAL (OUTPATIENT)
Dept: ADMINISTRATIVE | Facility: HOSPITAL | Age: 66
End: 2021-10-04

## 2021-10-04 LAB
ABS NEUT (OLG): 22.51 X10(3)/MCL (ref 2.1–9.2)
ALBUMIN SERPL-MCNC: 1.8 GM/DL (ref 3.4–4.8)
ALBUMIN/GLOB SERPL: 0.4 RATIO (ref 1.1–2)
ALP SERPL-CCNC: 93 UNIT/L (ref 40–150)
ALT SERPL-CCNC: 13 UNIT/L (ref 0–55)
AST SERPL-CCNC: 25 UNIT/L (ref 5–34)
BASOPHILS # BLD AUTO: 0 X10(3)/MCL (ref 0–0.2)
BASOPHILS NFR BLD AUTO: 0 %
BILIRUB SERPL-MCNC: 0.6 MG/DL
BILIRUBIN DIRECT+TOT PNL SERPL-MCNC: 0.2 MG/DL (ref 0–0.8)
BILIRUBIN DIRECT+TOT PNL SERPL-MCNC: 0.4 MG/DL (ref 0–0.5)
BUN SERPL-MCNC: 12.9 MG/DL (ref 8.4–25.7)
CALCIUM SERPL-MCNC: 9.5 MG/DL (ref 8.8–10)
CHLORIDE SERPL-SCNC: 97 MMOL/L (ref 98–107)
CO2 SERPL-SCNC: 26 MMOL/L (ref 23–31)
CREAT SERPL-MCNC: 0.61 MG/DL (ref 0.73–1.18)
EOSINOPHIL # BLD AUTO: 0 X10(3)/MCL (ref 0–0.9)
ERYTHROCYTE [DISTWIDTH] IN BLOOD BY AUTOMATED COUNT: 15.4 % (ref 11.5–14.5)
GLOBULIN SER-MCNC: 4.5 GM/DL (ref 2.4–3.5)
GLUCOSE SERPL-MCNC: 100 MG/DL (ref 82–115)
HCT VFR BLD AUTO: 26.8 % (ref 40–51)
HGB BLD-MCNC: 8.2 GM/DL (ref 13.5–17.5)
IMM GRANULOCYTES # BLD AUTO: 0.2 10*3/UL
IMM GRANULOCYTES NFR BLD AUTO: 1 %
LYMPHOCYTES # BLD AUTO: 0.4 X10(3)/MCL (ref 0.6–4.6)
LYMPHOCYTES NFR BLD AUTO: 2 %
MAGNESIUM SERPL-MCNC: 1.7 MG/DL (ref 1.6–2.6)
MCH RBC QN AUTO: 28.9 PG (ref 26–34)
MCHC RBC AUTO-ENTMCNC: 30.6 GM/DL (ref 31–37)
MCV RBC AUTO: 94.4 FL (ref 80–100)
MONOCYTES # BLD AUTO: 2.2 X10(3)/MCL (ref 0.1–1.3)
MONOCYTES NFR BLD AUTO: 9 %
NEUTROPHILS # BLD AUTO: 22.51 X10(3)/MCL (ref 2.1–9.2)
NEUTROPHILS NFR BLD AUTO: 89 %
NRBC BLD AUTO-RTO: 0 % (ref 0–0.2)
PHOSPHATE SERPL-MCNC: 2.1 MG/DL (ref 2.3–4.7)
PLATELET # BLD AUTO: 318 X10(3)/MCL (ref 130–400)
PMV BLD AUTO: 9.4 FL (ref 7.4–10.4)
POTASSIUM SERPL-SCNC: 4.1 MMOL/L (ref 3.5–5.1)
PROT SERPL-MCNC: 6.3 GM/DL (ref 5.8–7.6)
RBC # BLD AUTO: 2.84 X10(6)/MCL (ref 4.5–5.9)
SODIUM SERPL-SCNC: 134 MMOL/L (ref 136–145)
VANCOMYCIN TROUGH SERPL-MCNC: 19 UG/ML (ref 15–20)
WBC # SPEC AUTO: 25.4 X10(3)/MCL (ref 4.5–11)

## 2021-10-05 LAB
ABS NEUT (OLG): 20.36 X10(3)/MCL (ref 2.1–9.2)
ALBUMIN SERPL-MCNC: 1.7 GM/DL (ref 3.4–4.8)
ALBUMIN/GLOB SERPL: 0.4 RATIO (ref 1.1–2)
ALP SERPL-CCNC: 85 UNIT/L (ref 40–150)
ALT SERPL-CCNC: 13 UNIT/L (ref 0–55)
AST SERPL-CCNC: 25 UNIT/L (ref 5–34)
BASOPHILS # BLD AUTO: 0 X10(3)/MCL (ref 0–0.2)
BASOPHILS NFR BLD AUTO: 0 %
BILIRUB SERPL-MCNC: 0.7 MG/DL
BILIRUBIN DIRECT+TOT PNL SERPL-MCNC: 0.3 MG/DL (ref 0–0.8)
BILIRUBIN DIRECT+TOT PNL SERPL-MCNC: 0.4 MG/DL (ref 0–0.5)
BUN SERPL-MCNC: 12.3 MG/DL (ref 8.4–25.7)
CALCIUM SERPL-MCNC: 9.5 MG/DL (ref 8.8–10)
CHLORIDE SERPL-SCNC: 94 MMOL/L (ref 98–107)
CO2 SERPL-SCNC: 26 MMOL/L (ref 23–31)
CREAT SERPL-MCNC: 0.6 MG/DL (ref 0.73–1.18)
EOSINOPHIL # BLD AUTO: 0 X10(3)/MCL (ref 0–0.9)
EOSINOPHIL NFR BLD AUTO: 0 %
ERYTHROCYTE [DISTWIDTH] IN BLOOD BY AUTOMATED COUNT: 15.7 % (ref 11.5–14.5)
GLOBULIN SER-MCNC: 4.3 GM/DL (ref 2.4–3.5)
GLUCOSE SERPL-MCNC: 84 MG/DL (ref 82–115)
HCT VFR BLD AUTO: 26.8 % (ref 40–51)
HGB BLD-MCNC: 8.3 GM/DL (ref 13.5–17.5)
IMM GRANULOCYTES # BLD AUTO: 0.13 10*3/UL
IMM GRANULOCYTES NFR BLD AUTO: 1 %
LYMPHOCYTES # BLD AUTO: 0.5 X10(3)/MCL (ref 0.6–4.6)
LYMPHOCYTES NFR BLD AUTO: 2 %
MAGNESIUM SERPL-MCNC: 1.9 MG/DL (ref 1.6–2.6)
MCH RBC QN AUTO: 29.1 PG (ref 26–34)
MCHC RBC AUTO-ENTMCNC: 31 GM/DL (ref 31–37)
MCV RBC AUTO: 94 FL (ref 80–100)
MONOCYTES # BLD AUTO: 2 X10(3)/MCL (ref 0.1–1.3)
MONOCYTES NFR BLD AUTO: 8 %
NEUTROPHILS # BLD AUTO: 20.36 X10(3)/MCL (ref 2.1–9.2)
NEUTROPHILS NFR BLD AUTO: 89 %
NRBC BLD AUTO-RTO: 0 % (ref 0–0.2)
PHOSPHATE SERPL-MCNC: 2.4 MG/DL (ref 2.3–4.7)
PLATELET # BLD AUTO: 291 X10(3)/MCL (ref 130–400)
PMV BLD AUTO: 9.3 FL (ref 7.4–10.4)
POTASSIUM SERPL-SCNC: 3.2 MMOL/L (ref 3.5–5.1)
PROT SERPL-MCNC: 6 GM/DL (ref 5.8–7.6)
RBC # BLD AUTO: 2.85 X10(6)/MCL (ref 4.5–5.9)
SODIUM SERPL-SCNC: 133 MMOL/L (ref 136–145)
WBC # SPEC AUTO: 23 X10(3)/MCL (ref 4.5–11)

## 2021-10-08 LAB
FINAL CULTURE: NORMAL
FINAL CULTURE: NORMAL

## 2021-10-19 LAB — FINAL CULTURE: NORMAL

## 2022-09-13 NOTE — HISTORICAL [facility] CERNER
This is a historical note converted from [employer]. Formatting and pictures may have been removed.  Please reference [employer] for original formatting and attached multimedia. Admit and Discharge Dates  Admit Date: 10/03/2021  Discharge Date: 10/05/2021  Physicians  Attending Physician - Yasir MILLS, Wilbert  Admitting Physician - Yasir MILLS, Wilbert  Primary Care Physician - Brenda Salomon  Discharge Diagnosis  Concern for possible HAP  Poorly differentiated squamous cell carcinoma with unknown primary  A. fib  Hypophosphatemia  HTN?  HLD  Hx of symptomatic bleeding 2/2 GI Bleed  Malnourishment  Physical debilitation  Surgical Procedures  No procedures recorded for this visit.  Immunizations  No immunizations recorded for this visit.  Admission Information  Patient is a 66-year-old  male with significant medical history of symptomatic anemia secondary to small intestinal bleed, poorly differentiated squamous cell carcinoma with unknown origin, A. fib, history of CVA in the past, HTN presented to [facility] ED on 10/3 with chief complaint of S OB, wheezing, O2 sat at home ranging mid to high 80s.? He was recently discharged from hospital on 10/1 and hospitalized from 9/16-10/1 for generalized weakness, fatigue, weight loss about 25 pounds.? Further work-up revealed multiple mets probably with primary lung cancer.? During the hospital course he also had thoracentesis x2.?Last admission there was some concern for HAP but MRSA PCR and respiratory PCR was negative at that time.?Had mild recurrent pleural effusions likely secondary to underlying malignancy which were responsive to Lasix hence was sent home with p.o. Lasix.? The after next day he developed wheezing, SOB with O2 sats noted him mid to high 80s.? Upon arrival in the ED patient had a chest x-ray which showed prominent left basilar density with bilateral costophrenic angle blunting concerning for small bilateral pleural effusions.? He was admitted to IM team for  possible HAP.  Hospital Course  Upon admission MRSA PCR negative and CRP was elevated. The respiratory PCR and pro-calcitonin levels?had?not resulted prior to discharge. Due to his poor prognosis of likely less than 6 months, as related to the? squamous cell carcinoma the ?patient was physically debilitated?and exhibited?generalized weakness. It was suspected by oncology that pulmonary was likely the primary source of his lung cancer. Initially we were to have him follow-up with oncology regarding further options. . He began to have bone pain secondary to?osteolytic lesions from his cancer. We continued multimodal pain regimen with Norco and lidocaine patches. At this time, ?we discussed code status with the family in the room. After much deliberation by the family and the patient,?they?decided to make the patient DNR/DNI. In the meantime, they also requested to speak to hospice. Electrolytes were repleted accordingly daily. The blood pressure was maintained by restarting his home medications. There was a history of symptomatic bleeding secondary to G.I. bleed on admission with an?H&H?of 8.5/27. There was no active bleeding and the patient was placed on Protonix 40 mg daily prophylactically. The nutritionist was consulted regarding additional supplements. Due to the episodes of A-fib with AVR his metoprolol succinate was increased to 75 mg daily. The CHADS2-VASc score was greater than 2?on the Xarelto 20 mg daily. Subsequently, the patient and the family decided to be pursue hospice services and were discharged accordingly.  Significant Findings  * Final Report *  ?  Reason For Exam  Dyspnea  ?  Radiology Report  one view of the chest  ?  CPT 16980  ?  HISTORY:  Dyspnea  ?  FINDINGS:  Examination reveals cardiomediastinal silhouette is within normal  limits.  ?  Prominent interstitial markings again identified more confluent at the  left base superimposed infiltrate cannot be completely excluded.  ?  There is  blunting of both costophrenic angles which may indicate the  presence of bilateral pleural effusions  ?  There is increased left retrocardiac density and silhouetting of the  left hemidiaphragm although it might be related to pleural fluid it  might also represent an infiltrate/atelectases?  ?  No other focal consolidative changes no other interval change  ?  IMPRESSION:  ?  Prominent interstitial and airspace opacities at the left base this  might be related to a superimposed infiltrate.  ?  Increased left retrocardiac density and silhouetting of the left  hemidiaphragm as above  ?  Bilateral pleural effusions  ?  No other interval change  ?  Time Spent on discharge  ?>35 minutes  Objective  Vitals & Measurements  T:?36.8? ?C (Oral)? TMIN:?36.3? ?C (Oral)? TMAX:?36.8? ?C (Oral)? HR:?123(Monitored)? RR:?24? BP:?113/78? SpO2:?98%?  Physical Exam  General: cooperative, no acute respiratory distress  HEENT: ATNC, PERRL, EOMI, conjunctiva clear, EAC clear, TM translucent, no external lesions of nares,??nasal mucosa non-inflamed, no lesions, oral mucous membranes moist and pink??without exudates or hypertrophy, neck supple non tender?without lesionsor thyromegaly  CV: RRR, no murmurs  Lungs:?CTA b/l, no wheezing  Abdomen: soft, NT, ND, + BS x 4, no organomegaly, no CVA tenderness  Extremities: no cyanosis, clubbing, or edema  SKIN: inspection and palpation of skin and soft tissue normal; no scars noted on upper/lower extremities  PSYCH: alert and oriented x 3?with?appropriate mood and affect  NEURO: sensation intact by light touch; DTRs +2 bilateral and symmetrical  LYMPH: no LAD noted  MSK:? full range of motion of upper and lower extremities, 5/5 strength, sensation in tact throughout  Patient Discharge Condition  stable  Discharge Disposition  Home with hospice  ?  Faculty addendum:  I have reviewed the patients history, residents ?findings on physical examination, diagnosis and treatment plan. Care provided was  reasonable and necessary.  ?  see addendum to PN   Discharge Medication Reconciliation  Prescribed  albuterol-ipratropium (DuoNeb)?3 mL, NEB, q6hr  levoFLOXacin (levofloxacin 750 mg oral tablet)?750 mg, Oral, Daily  Continue  aspirin (aspirin 81 mg oral Delayed Release (EC) tablet)?81 mg, Oral, Daily  atorvastatin (Lipitor 20 mg oral tablet)?20 mg, Oral, Daily  docusate (docusate sodium 100 mg oral capsule)?100 mg, Oral, BID  ferrous sulfate (ferrous sulfate 325 mg oral tablet)?325 mg, Oral, Every other day  furosemide (Lasix 20 mg oral tablet)?20 mg, Oral, Daily  gabapentin (gabapentin 300 mg oral capsule)?300 mg, Oral, At Bedtime  gabapentin (gabapentin 300 mg oral capsule)?300 mg, Oral, At Bedtime  lidocaine topical (lidocaine 5% topical film)?1 patch(es), TD, Daily  metoprolol (metoprolol succinate 50 mg oral tablet extended release)?50 mg, Oral, Daily  mirtazapine (mirtazapine 30 mg oral tablet)?15 mg, Oral, Once a day (at bedtime)  pantoprazole (Protonix 40 mg ORAL enteric coated tablet)?40 mg, Oral, BID  polyethylene glycol 3350 (MiraLax oral powder for reconstitution)?17 gm, Oral, Daily  rivaroxaban (Xarelto 20mg Tablet)?20 mg, Oral, With Supper  tamsulosin (Flomax 0.4 mg oral capsule)?0.4 mg, Oral, Daily  Discontinue  docusate (docusate sodium 100 mg oral capsule)?100 mg, Oral, BID  furosemide (Lasix 20 mg oral tablet)?20 mg, Oral, Daily  gabapentin (gabapentin 300 mg oral capsule)?300 mg, Oral, At Bedtime  lidocaine topical (lidocaine 5% topical film)?1 patch(es), TD, Daily  mirtazapine (mirtazapine 30 mg oral tablet)?15 mg, Oral, Once a day (at bedtime)  polyethylene glycol 3350 (MiraLax oral powder for reconstitution)?17 gm, Oral, Daily  tamsulosin (Flomax 0.4 mg oral capsule)?0.4 mg, Oral, Daily  Education and Orders Provided  Healthcare-Associated Pneumonia  Follow up  F/up with Hospice PCP, within 1 week  Report to Emergency Department if symptoms return or worsen

## 2022-09-13 NOTE — HISTORICAL [facility] CERNER
This is a historical note converted from Jennifer. Formatting and pictures may have been removed.  Please reference Jennifer for original formatting and attached multimedia. Chief Complaint  pt. AAOx4. pt. presents to ED with abn. lab results from PCP. H&H 8.8 & 28 on 9/13. c/o weakness and SOB. + black stools. reports chest discomfort on R. side when taking a deep breath. pallor at present  History of Present Illness  66-year-old male who presented to [facility] ED with melenic stools on 8/27/2021 and discharged on 9/1/21 s/p blood transfusion with 2 units PRBCs and treated for CAP.??  ?   Patient is present with his wife?at bedside today. ?Patient states that he?has?been feeling weak, and tired with shortness of breath?and mentions right-sided chest pain?since Monday.? Patient states that pain worsens with movement and?heavy lifting.??He continues to have cough, however, it is nonproductive. He spoke to his?primary care provider,?Brenda Cueva,?NP?and explained to her how he was feeling.?Ms. Cueva?looked at his labs and realized his RBC counts and H/H?were low and?told and?him to come back to the ED to the?evaluated. Patient is taking all of his medications as prescribed.? They are awaiting pill endoscopy per GI scheduled, 9/21/21. Covid negative.  ?   PMHx: AFIB, Hx of CVA s/p rt. CEA (2017), CAD, Anemia, HLD, GERD  PSHx: Rt. CEA 2017, appendectomy  FamHx: Mom: Breast Cancer, HTN, Heart disease. Father: HTN, Heart Disease  SocHx: 3 cigarettes/day, no Etoh, No illicit drugs. Retired and lives with wife.  Allergies: NKDA  Medications: Xarelto?20?mg,?aspirin 81?mg,?atorvastatin 20?mg,?Toprol succinate 50?mg,?Flexeril,?Protonix?40?mg  Review of Systems  Constitutional:?Subjective fever  Eye:?Blurry vision bilaterally  ENMT:?no sore throat or sinus problems  Respiratory:?+ Shortness of breath and cough  Cardiovascular:?Right sided chest pain,?no ankle swelling  Gastrointestinal:+ Constipation,?  Musculoskeletal:?No muscle  pain,?no joint pain  Integumentary: no rashes or breakdown  Neurologic: no dizziness, no fainting  Physical Exam  Vitals & Measurements  T:?36.4? ?C (Oral)? HR:?87(Peripheral)? HR:?84(Monitored)? RR:?25? BP:?171/88? SpO2:?95%?  General:?Alert and oriented,?no acute distress, Lying in bed on room air. 02 saturation 92-96%  HEENT:?Oropharynx clear without any signs of erythema?,?MMM, EOMI, PERRLA, Sclera clear, with no conjunctival injection  Respiratory:?Biphasic wheezes R>L,?No increased work of breathing  Cardiovascular:?S1-S2, regular rate, regular rhythm,?2+ radial pulses,?No lower extremity edema  Gastrointestinal:?Soft, nontender, nondistended?FOBT +  Musculoskeletal: Appropriate movement of extremities bilaterally  Integumentary: No rashes or breakdown noted  Neurologic: No focal motor defects noted  Psych: Calm, cooperative  Assessment/Plan  ?Increased shortness of breath  -Likely 2/2 Symptomatic Anemia vs Sepsis?secondary to?community-acquired pneumonia  -SIRS 2/4 on admission  -Blood culture,?UA/culture?to rule out infectious causes of?fatigue?and weakness  -Chest x-ray?is significant for new lower lung?interstitial opacities?that may be?mild pulmonary edema or pneumonia  -Azithromycin?500 started empirically for possible pneumonia. Can D/C if infection ruled out.  As needed?DuoNeb?for significant wheezing  -Start duonebs q4h  -Supplemental O2  -Patient currently?on room air, saturating 95%. Monitor O2 requirements.  -COVID - (9/16/21)  ?   Dehydration  Likely 2/2 GI Bleed  H/H currently 8.3/26.7. Blood transfusion if significant drop in H/H from admission.  Hold Xarelto and aspirin?with concern for increased bleeding.  Volume resuscitation with NS @ 100cc/hr  ?  Chronic A-fib w/hx CVA  -Hold Xarelto 20 mg  -continue metoprolol succinate 50 mg daily  ?   Anemia  Possibly Anemia of Chronic Disease  Monitor H&H  Order CBC  Repeat Iron studies?  Pending anemia work-up, no transfusion at this time.  Continue Fe  Sulfate 325mg  ?   Hypercalcemia of unknown cause  -Ca 13 (14.6 corrected)  Patient has history of cigarette smoking and reports weight loss.  Ordered TSH, Vit D  Ordered Chest CT. Results pending.  ?   Hypokalemia/Hypophosphatemia  K+ 3.1, Phos 2.1 on admission  Replete electrolytes as needed.  ?   HTN  -continue metoprolol succinate 50mg  ?   HLD  -continue atorvastatin 20 mg daily  ?  Access: PIV  Abx: Azithromycin  Diet: Cardiac  DVT Px: Home Xarelto (hold at this time) and SCDs  IVF: NS 100cc/hr  GI PPx: Protonix 40mg  ?   Dispo:?Patient will be admitted to remote telemetry for observation.? Need to repeat labs in?the morning.Followup blood cultures.CTA chest in am. ?Consult GI in the a.m.  ?  ?   Gennaro Duke M.D., MPH  [facility] HO-1  ?  ?  HO-2 Addendum:?  ?   I agree with Dr. Duke?s plan and assessment as above.  ?  66yr old  male with PMH of HTN,?CAD, A-fib,?CVA, and carotid artery disease status post right CEA presents to the?ED with complaints of SOB, generalized weakness,?fatigue, black starry stools, and intermittent RUQ pain?since 9/13. Patient was recently discharged on 9/1 with similar presentation. Patients wife states he has a chronic hx of anemia and after he receives a blood transfusion, he is back to baseline, but states these last two episodes are different.?Patient is scheduled for a CT chest on 9/17 and capsule endoscopy for?9/21.?Patient denies?any hx of colon cancer, diverticulosis, alcohol abuse,?and NSAID use.?Associated symptoms: weight loss?(wife says more than 10 pounds) and decreased appetite.  ?  PE:  General: The patient is pleasant and cooperative and in no acute distress. Sitting upright comfortably on RA.?Very pale looking. ?  Head: Skull is normocephalic and atraumatic.  Eyes: Pupils are equal, round, and reactive to light. Extraocular movements are intact and equal.?Paleness in?lower eyelids.  Mouth: Mucous membranes are moist.  Neck: Supple. Full  active and full passive, range of motion in all directions. No lymphadenopathy/ thyromegaly is noted. No JVD.  Chest:?Wheezing heard throughout both lung fields.?  Cardiovascular: Regular rate and rhythm.  Abdomen: Soft, non-distended, RUQ tenderness, Left rib pain.?Positive bowel sounds.?+FOBT.  Extremities: No clubbing, cyanosis, or edema.  Neurological: Cranial nerves 2-12 are intact. Alert and oriented.  ?  Plan and assessment:  Anemia, symptomatic?  -Could be secondary to PUD/ Malignancy  -Patient is stable  -H/H: 8.3/26.7  -BUN/Cr: 16.4/0.77  +melena, +FOBT, weight loss, +RUQ pain  -Started him on Protonix IV 40mg BID  -Received a bolus of 1L in ED, maintained on NS 125ml/hr  -CT chest ordered?  -Colonoscopy from 2/19/21: entire colon was normal  -Colonoscopy from 8/12/20: 11 polyps removed. Small internal hemorrhoids.  -EGD: 8/12/20: normal?  -Capsule endoscopy planned for 9/21  -Consult GI, for further recs  -NPO  ?  Dyspnea  -Possibly secondary to pneumonia/infxn  +leukocytosis, SOB, and wheezing  -Long hx of tobacco use  -CXR:?interval increase in bilateral pleural effusions, small volume on the right and small to moderate volume on the left. Streaky bibasilar opacities favored to represent compressive atelectasis, although the possibility of developing bibasilar infiltrates is not excluded.  -CT ordered for AM  -Ordered blood cultures x2  -Duo- nebs PRN q4hr  -Received one dose of Rocephin, discontinued  -Started on Azithromycin?  ?  Hypercalcemia  -Ca 13.3  -Received a bolus of 1L in ED, maintained on NS 125ml/hr  -Ordered PTH, Vit D level, repeat?CMP  ?  ?  Hypokalemia and Hypophosphatemia  -Potassium: 3.1, Phos: 2.1 on admission  -Repleting with Neutra-Phos  -Repeat CMP  ?  HTN  -Continued home medication  ?  Hx of A-fib?  -Continued on home medications, Metoprolol and Xarelto (as risk for stroke is higher than bleeding, can replace blood as needed)  -EKG: A-fib, T wave abnormality, consider anterior  ischemia?  -Echo: 8/27: LVEF 60%  ?  ?  ?  Abx: Azithromycin  IVF: NS 125ml/hr  GI PPx: Protonix 40mg IV BID  PPx:?Xarelto  ?  ?  Dispo: 67 y/o male admitted to medicine team for symptomatic anemia workup. Receiving IVF NS 125ml/hr. Continuing home medications. Currently NPO. Consult with GI in AM. Will closely monitor vitals and labs.?  ?  Thalia Jordan HO-2  ?  ?   Immunizations  Vaccine Date Status Comments   zoster vaccine, inactivated 06/21/2021 Given    COVID-19 mRNA, LNP-S, PF - Moderna 03/30/2021 Recorded    COVID-19 mRNA, LNP-S, PF - Moderna 03/02/2021 Recorded    pneumococcal 23-polyvalent vaccine 11/24/2020 Given    zoster vaccine, inactivated 11/24/2020 Given    influenza virus vaccine, inactivated 11/05/2020 Given    tetanus/diphtheria/pertussis, acel(Tdap) 11/05/2020 Given    influenza virus vaccine, inactivated 11/11/2019 Given    influenza virus vaccine, inactivated 11/08/2018 Given tolerated well   influenza virus vaccine, inactivated 11/30/2016 Given    Lab Results  Labs Last 24 Hours?  ?Chemistry? Hematology/Coagulation?   Sodium Lvl: 139 mmol/L (09/16/21 12:51:00) PT:?17.2 second(s)?High (09/16/21 12:51:00)   Potassium Lvl:?3.1 mmol/L?Low (09/16/21 12:51:00) INR:?1.42?High (09/16/21 12:51:00)   Chloride:?93 mmol/L?Low (09/16/21 12:51:00) PTT: 34.2 second(s) (09/16/21 12:51:00)   CO2: 29 mmol/L (09/16/21 12:51:00) WBC:?18.4 x10(3)/mcL?High (09/16/21 12:51:00)   Calcium Lvl:?13.3 mg/dL?Critical (09/16/21 12:51:00) RBC:?2.82 x10(6)/mcL?Low (09/16/21 12:51:00)   Magnesium Lvl: 1.6 mg/dL (09/16/21 12:21:00) Hgb:?8.3 gm/dL?Low (09/16/21 12:51:00)   Glucose Lvl:?137 mg/dL?High (09/16/21 12:51:00) Hct:?26.7 %?Low (09/16/21 12:51:00)   BUN: 16.4 mg/dL (09/16/21 12:51:00) Platelet:?682 x10(3)/mcL?High (09/16/21 12:51:00)   Creatinine: 0.77 mg/dL (09/16/21 12:51:00) MCV: 94.7 fL (09/16/21 12:51:00)   eGFR-AA: >105 (09/16/21 12:51:00) MCH: 29.4 pg (09/16/21 12:51:00)   eGFR-NAMAN: >105 (09/16/21 12:51:00)  MCHC: 31.1 gm/dL (09/16/21 12:51:00)   Bili Total: 0.5 mg/dL (09/16/21 12:51:00) RDW:?14.6 %?High (09/16/21 12:51:00)   Bili Direct: 0.3 mg/dL (09/16/21 12:51:00) MPV: 9 fL (09/16/21 12:51:00)   Bili Indirect: 0.2 mg/dL (09/16/21 12:51:00) Abs Neut:?15.25 x10(3)/mcL?High (09/16/21 12:51:00)   AST:?36 unit/L?High (09/16/21 12:51:00) Neutro Auto: 83 % (09/16/21 12:51:00)   ALT: 22 unit/L (09/16/21 12:51:00) Lymph Auto: 6 % (09/16/21 12:51:00)   Alk Phos: 61 unit/L (09/16/21 12:51:00) Mono Auto: 10 % (09/16/21 12:51:00)   Total Protein: 6.3 gm/dL (09/16/21 12:51:00) Eos Auto: 0 % (09/16/21 12:51:00)   Albumin Lvl:?2 gm/dL?Low (09/16/21 12:51:00) Abs Eos: 0 x10(3)/mcL (09/16/21 12:51:00)   Globulin:?4.3 gm/dL?High (09/16/21 12:51:00) Basophil Auto: 0 % (09/16/21 12:51:00)   A/G Ratio:?0.5 ratio?Low (09/16/21 12:51:00) Abs Neutro:?15.25 x10(3)/mcL?High (09/16/21 12:51:00)   Phosphorus:?2.1 mg/dL?Low (09/16/21 12:21:00) Abs Lymph: 1.1 x10(3)/mcL (09/16/21 12:51:00)    Abs Mono:?1.9 x10(3)/mcL?High (09/16/21 12:51:00)    Abs Baso: 0 x10(3)/mcL (09/16/21 12:51:00)    NRBC%: 0.0 (09/16/21 12:51:00)    IG%: 1 % (09/16/21 12:51:00)    IG#: 0.14 (09/16/21 12:51:00)   Diagnostic Results  Accession:?[id number]  Order:?XR Chest 1 View  Report Dt/Tm:?09/16/2021 16:19  Report:?  EXAM: XR Chest 1 View  DATE: 9/16/2021 4:18 PM CDT  INDICATION: Chest pain  ?  COMPARISON: Chest radiographs 9/1/2021.  ?  TECHNIQUE: Frontal view of the chest.  ?  ?  FINDINGS:?  The cardiomediastinal silhouette is stable in size and contour.  Redemonstrated aortic knob calcific atherosclerosis. Interval increase  in previously visualized bilateral pleural effusions, small volume on  the right and small to moderate volume on the left. Streaky bibasilar  opacities are favored to represent compressive atelectasis; however,  developing infiltrates are not excluded. No pneumothoraces.  ?  The imaged osseous structures and soft tissues are without  acute  abnormality.  ?  ?  IMPRESSION:  Interval increase in bilateral pleural effusions, small volume on the  right and small to moderate volume on the left. Streaky bibasilar  opacities favored to represent compressive atelectasis, although the  possibility of developing bibasilar infiltrates is not excluded.  ?

## 2022-09-13 NOTE — HISTORICAL [facility]
This is a historical note converted from Ceranu. Formatting and pictures may have been removed.  Please reference Jennifer for original formatting and attached multimedia. Chief Complaint  pt. AAOx4. pt. presents to ED with abn. lab results from PCP. H&H 8.8 & 28 on 9/13. c/o weakness and SOB. + black stools. reports chest discomfort on R. side when taking a deep breath. pallor at present  Reason for Consultation  Chest tube placement  History of Present Illness  Deepak Castro is a 67 yo M w/ PMHx of CVA (on Xarelto), atrial fibrillation, HTN, CAD?who was recently admitted for generalized weakness in late August. He was found to have melena and anemia. GI had recently done a colonoscopy showing polyps that were removed and were found to be benign. They recommended pill endoscopy?at?discharge.?He was subsequently discharged but was feeling persistently fatigued. He was readmitted on 9/16 with cough and shortness of breath. Chest x-ray?was significant for new lower lung?interstitial opacities. CT scan showed lytic lesion consistent w/ metastatic disease w/ unknown primary. Biopsy of his left rib was done by IR on 9/23 w/ path consistent w/ squamous cell carcinoma. ENT was consulted and performed a flexible laryngoscopy today w/o significant findings. Since this morning he has had increasing work of breathing and a R sided pneumothorax was noted on CT of the neck to evaluate for a source of his malignancy. He has had increasing oxygen requirements during this time, from 1L to 4L. Surgery was consulted following an additional chest XR w/ evidence of pneumothorax.  Review of Systems  Denies any N/V.  Endorses fatigue, constipation, shortness of breath, chest pain.  Physical Exam  Vitals & Measurements  T:?36.5? ?C (Oral)? TMIN:?36? ?C (Axillary)? TMAX:?36.6? ?C (Axillary)? HR:?118(Peripheral)? RR:?22? BP:?119/88? SpO2:?95%?  Gen: Mild distress  Neuro: No focal deficit  Card: RR  Pulm: Slightly increased WOB on 4L NC  GI:  NT, slightly distended  MS: Moves all extremities  Integ: WWP, no obvious lesions?  Assessment/Plan  Deepak Castro is a 67 yo M with right sided pneumothorax who is undergoing workup for metastatic malignancy w/ unknown primary tumor.  ?  -Risks and benefits explained, patient wishes to proceed w/ R sided chest tube placement  -Patient tolerated the procedure well w/ return of 1 L of SS fluid  -CXR confirming adequate placement of tube, leave to suction  -Surgery will follow along for CT management  ?  Emanuel Jean-Baptiste MD  [employer] Surgery, PGY-1   Problem List/Past Medical History  Ongoing  Actinic keratosis of right temple  Anemia  Atrial fibrillation  Basal cell carcinoma  Bulging lumbar disc  Carotid stenosis  Chronic back pain  Colon polyps  Essential hypertension  H/O: CVA  Hypercholesterolemia  Hyperlipidemia  Impacted cerumen  Internal hemorrhoid  Left wrist pain  Tobacco user  Procedure/Surgical History  M2A Capsule Endoscopy (09/20/2021)  Polypectomy (None) (08/12/2020)  Adjacent tissue transfer or rearrangement, eyelids, nose, ears and/or lips; defect 10 sq cm or less (06/28/2019)  Excision of Face Subcutaneous Tissue and Fascia, Open Approach (06/28/2019)  Carotid Artery Endarterectomy (Right) (05/15/2017)  Extirpation of Matter from Right Common Carotid Artery, Open Approach (05/15/2017)  Supplement Right Common Carotid Artery with Synthetic Substitute, Open Approach (05/15/2017)  Fluoroscopy of Left Heart using Low Osmolar Contrast (03/01/2017)  Appendectomy  Excision of ganglion of hand  ORIF - Open reduction of fracture of ankle with internal fixation  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   Medications  Inpatient  aspirin, 81 mg= 1 tab(s), Oral, Daily  cyclobenzaprine 10 mg oral tablet, 5 mg= 0.5 tab(s), Oral, TID  docusate sodium 100 mg oral capsule, 100 mg= 1 cap(s), Oral, BID  DuoNeb, 3 mL, NEB, BID  ferrous sulfate 325 mg oral tablet, 325 mg= 1 tab(s), Oral, Every other day  Flomax 0.4 mg oral capsule, 0.4 mg= 1  cap(s), Oral, Daily  gabapentin 300 mg oral capsule, 300 mg= 1 cap(s), Oral, At Bedtime  heparin 5000 units/mL injectable solution, 5000 units= 1 mL, Subcutaneous, On Call  labetalol 5 mg/mL intravenous solution, 5 mg= 1 mL, IV Push, q2hr, PRN  Lipitor 20 mg oral tablet, 20 mg= 1 tab(s), Oral, Daily  metoprolol succinate 50 mg oral tablet extended release, 50 mg= 1 tab(s), Oral, Daily  morphine 2 mg/mL preservative-free injectable solution, 2 mg= 1 mL, IV, q4hr, PRN  morphine 2 mg/mL preservative-free injectable solution, 2 mg= 1 mL, IV, Once  nitroglycerin 0.4 mg sublingual TAB, 0.4 mg= 1 tab(s), SL, q5min, PRN  potassium phosphate-sodium phosphate 250 mg-280 mg-160 mg oral powder for reconstitution, 1 tab(s), Oral, BID  Protonix, 40 mg= 1 EA, IV Slow, BID  sodium chloride 1 g oral tablet, 1000 mg= 1 tab(s), Oral, Daily  Solumedrol IV push / IM, 30 mg= 0.75 mL, IV Push, Daily  Toradol 15 mg for IV Push, 15 mg= 0.5 mL, IV Slow, Daily, PRN  traZODone, 50 mg= 1 tab(s), Oral, Once a day (at bedtime)  Xarelto, 20 mg= 2 tab(s), Oral, At Bedtime  Zosyn 3.375gm/50ml premix, 3.375 gm= 50 mL, IV Piggyback, q8hr  Home  aspirin 81 mg oral Delayed Release (EC) tablet, 81 mg= 1 tab(s), Oral, Daily, 3 refills  ferrous sulfate 325 mg oral tablet, 325 mg= 1 tab(s), Oral, Every other day  Lipitor 20 mg oral tablet, 20 mg= 1 tab(s), Oral, Daily, 3 refills  metoprolol succinate 50 mg oral tablet extended release, 50 mg= 1 tab(s), Oral, Daily, 3 refills  Protonix 40 mg ORAL enteric coated tablet, 40 mg= 1 tab(s), Oral, BID  Xarelto 20mg Tablet, 20 mg, Oral, With Supper, 11 refills  Allergies  No Known Allergies  Social History  Alcohol  Current, Beer, 1-2 times per month, 09/13/2021  Substance Use  Never, 11/29/2016  Tobacco  5-9 cigarettes (between 1/4 to 1/2 pack)/day in last 30 days, No, 09/17/2021  4 or less cigarettes(less than 1/4 pack)/day in last 30 days, No, 09/16/2021  4 or less cigarettes(less than 1/4 pack)/day in last 30  days, Cigarettes, No, 09/13/2021  Family History  Breast cancer: Mother.  Heart disease: Mother and Father.  Hypertension.: Mother.  Lab Results  Pleural fluid:  - Reactive mesothelial hyperplasia with severe acute and chronic inflammation.  - No evidence of malignancy.  Diagnostic Results  CXR: Moderate (\ R\ 33%) right pneumothorax is new from 9/24/2021.  ?  No left pneumothorax.  ?  Moderate bilateral pleural effusions effusions with associated  atelectasis persist.  Right upper lobe airspace opacities/atelectasis is new.  ?  CT Neck: 1. Bilateral lower jugulodigastric necrotic adenopathy.  ?  2. Lytic metastases to the C3 vertebral body and left scapula. No  acute pathologic fracture.  ?  3. Large right hydropneumothorax.  ?  CT A/P 9/21: IMPRESSION:  Heterogeneous soft tissue prominence at the penile glands with  surrounding stranding and contiguous fluid extending into the anterior  scrotum with major considerations inflammatory versus neoplastic which  direct visualization would be helpful  ?  Several small right hepatic lesions which are not simple cystic  ?  Subtle striated renal nephrogram possibly ATN, pyelonephritis versus  small infarcts  ?  L2 Lytic osseous lesion concerning for metastatic disease with  suggestion of dural extension not described above

## 2022-09-13 NOTE — HISTORICAL [facility]
This is a historical note converted from Jennifer. Formatting and pictures may have been removed.  Please reference Jennifer for original formatting and attached multimedia. Admit and Discharge Dates  Admit Date: 08/12/2020  Discharge Date: 08/13/2020  Physicians  Attending Physician - Gerry SAENZ, Alycia BENJAMIN  Admitting Physician - Gerry SAENZ, Alycia BENJAMIN  Consulting Physician - Jose C SAENZ, Katie MARES  Primary Care Physician - Hammad ESPINOZA, Brenda SHAY  Discharge Diagnosis  1.?Blood in stool?[id number]  ?  2.?GI bleeding?[id number]  ?  3.?H/O: CVA?Z86.79  ?  4.?Hypertension?I10  ?  5.?Diabetes mellitus?E11.9  ?  Carotid stenosis?I65.29  ?  Lower GI bleed?K92.2,?Lower GI bleed?K92.2  ?  Microcytic anemia?D50.9,?Microcytic anemia?D50.9  ?  Surgical Procedures  08/12/2020 - [id number] - Polypectomy  08/12/2020 - [id number] - Colon Tattoo  08/12/2020 - [id number] - Colonoscopy  08/12/2020 - [id number] - Esophagogastroduodenoscopy  Immunizations  No immunizations recorded for this visit.  Admission Information  Mr. Castro is a 65-year-old gentleman with PMH of Atrial Fibrillation, HTN, HLD, prior CVA, tobacco smoking, alcohol use who presented to the [facility] ER with complaints of melanotic black stools which he has been experiencing for the last few months. ?He was a poor historian, reportedly secondary to his CVA as per his wife and most of his history was provided by her. ?She stated that he had been experiencing fatigue and weakness for the last few months which had been progressively worsening. ?He had also been noticing black tarry stools intermittently for the last few months and that she noticed dark maroon blood in his stools for the first time this morning. ?She also reported that his PCP had reached out to her and told her to come in for some blood work today as his Hb/HCT had dropped from 14.2/43.7 (05/2020) to 11.3/35.7 (09/2019). On repeating his CBC today,  his Hb/Hct was seen to be 7.4/25.7 with MCV of 84.3. He also had a positive FIT test.?  Mr. Castro denied having any fever, chills, chest pain, shortness of breath, cough, sputum production, abdominal pain, rectal pain, tenesmus, diarrhea, constipation, gross hematochezia, headache, numbness, weakness, dizziness/lightheadedness or loss of consciousness. ?Notably, he is on Xarelto 20 mg daily for his atrial fibrillation.  Hospital Course  In the ER, Mr. Castro was noted to have BP of 157/105 mmHg with HR of 115/min. ?He was saturating adequately on room air. ?His initial labs showed BUN/creatinine of 8/0.90, AST/ALT of 16/14, ALP of 74, iron of 14, transferrin of 327, TIBC of 431, ferritin of 4.8 and iron saturation of 3.2 indicating iron deficiency. ?His HbA1c was 6.8, PT/PTT were 21.0/34.3, WBCs of 6.7, platelets were 310 and HB/HCT was 7.4/25.7.He was made NPO, started on maintenance fluids of IV LR 75/hr overnight and IV Protonix 40 mg. GI was consultated for an EGD/Colonoscopy. His Hb/Hct was noted to be 6.7/23.2 and he was started on transfusion with 2 units pRBCs to which his blood counts responded to Hb/Hct of 8.6/28.7. GI saw the patient and planned for EGD and Colonoscopy on 08/12. His Echo showed EF 50-55%, trace MR, trace TR, RVSP of 36 mmHg.  ?   In the EGD no abnormalities were found, however in the Colonoscopy, three polyps were found each in the ascending, transverse, descending colons as well as one polyp in the rectum which were all removed. Mr Castro was monitored overnight and his Hb/Hct remained stable at 8.2/27.5. He was planned for discharge with instructions to not take his Xarelto for 7 days as per GI Recommendations. He will be followed up in GI?lab in 3 months on 11/11/2020 for a repeat colonoscopy. He was clinically and hemodynamically stable. His wife was called and notified of the plan to discharge in order for her to come fetch him. Patient will be followed up in [facility].  ?  Time Spent on discharge  45 mins  Objective  Vitals & Measurements  T:?37.2? ?C (Oral)? TMIN:?36.8? ?C (Oral)? TMAX:?37.7? ?C (Oral)? HR:?88(Peripheral)? HR:?90(Monitored)? RR:?19? BP:?164/92? SpO2:?98%?  Physical Exam  General: Alert, awake male lying comfortably in bed, in no acute distress; mildly confused, unable to answer some questions without help from wife yes  HENT: oral and oropharyngeal mucosa moist, pink, with no erythema or exudates, no ear pain or discharge  Neck: normal neck movement,?no lymph nodes or swellings,?JVD present with hepatojugular reflux, no Carotid bruit  Respiratory: clear breathing sounds bilaterally, no crackles, rales, rhonchi or wheezes  Cardiovascular: clear S1 and S2, no murmurs, rubs or gallops  Peripheral Vascular: no lesions, ulcers or erosions, normal peripheral pulses and no pedal edema  Gastrointestinal: soft, non-tender, non-distended abdomen, no guarding, rigidity or rebound tenderness, normal bowel sounds  Integumentary: normal skin color, no rashes or lesions?  Patient Discharge Condition  Stable  Discharge Disposition  Discharge patient home once wife arrives to pick him up  F/U in [facility] with [facility]  F/U in GI Lab for repeat Colonoscopy   Discharge Medication Reconciliation  Prescribed  ferrous sulfate (ferrous sulfate 325 mg oral tablet)?325 mg, Oral, Every other day  Continue  aspirin (aspirin 81 mg oral Delayed Release (EC) tablet)?81 mg, Oral, Daily  aspirin (aspirin 81 mg oral Delayed Release (EC) tablet)?81 mg, Oral, Daily  aspirin (aspirin 81 mg oral Delayed Release (EC) tablet)?81 mg, Oral, Daily  aspirin (aspirin 81 mg oral Delayed Release (EC) tablet)?81 mg, Oral, Daily  atorvastatin (Lipitor 20 mg oral tablet)?20 mg, Oral, Daily  atorvastatin (Lipitor 20 mg oral tablet)?20 mg, Oral, Daily  atorvastatin (Lipitor 20 mg oral tablet)?20 mg, Oral, Daily  atorvastatin (Lipitor 20 mg oral tablet)?20 mg, Oral, Daily  metoprolol (metoprolol  succinate 50 mg oral tablet extended release)?50 mg, Oral, Daily  metoprolol (metoprolol succinate 50 mg oral tablet extended release)?50 mg, Oral, Daily  metoprolol (metoprolol succinate 50 mg oral tablet extended release)?50 mg, Oral, Daily  metoprolol (metoprolol succinate 50 mg oral tablet extended release)?50 mg, Oral, Daily  multivitamin with minerals (multivitamin with minerals (Adult Tab))?1 tab(s), Oral, Daily  rivaroxaban (Xarelto 20mg Tablet)?20 mg, Oral, With Supper  rivaroxaban (Xarelto 20mg Tablet)?20 mg, Oral, With Supper  rivaroxaban (Xarelto 20mg Tablet)?20 mg, Oral, With Supper  rivaroxaban (Xarelto 20mg Tablet)?20 mg, Oral, With Supper  Education and Orders Provided  Colon Polyps  Hypertension, Adult  Diabetes Mellitus and Nutrition, Adult  Discharge - 08/13/20 12:59:00 CDT, Home, Discharge home with medications?  Follow up  Katie Woodall, within 3 days, on 11/11/2020  colonoscopy in 3 months-call [phone] for prp instructions  F/U in [facility] with Dr Cuello in [facility], on 09/07/2020      Patient seen and examined 8/13/21.? Stable for discharge.? Agree with plan as documented.

## 2022-09-13 NOTE — HISTORICAL [facility]
This is a historical note converted from [employer]ner. Formatting and pictures may have been removed.  Please reference Cerner for original formatting and attached multimedia. Admit and Discharge Dates  Admit Date: 08/28/2021  Discharge Date: 09/01/2021  Physicians  Attending Physician - Landon SAENZ, Mariela  Admitting Physician - Landon SAENZ, Mariela  Primary Care Physician - Brenda Salomon  Discharge Diagnosis  1.?UGIB (upper gastrointestinal bleed)?K92.2  2.?Acute blood loss anemia?D62  Atrial Fibrillation  Surgical Procedures  No procedures recorded for this visit.  Immunizations  No immunizations recorded for this visit.  Admission Information  This is a 58-year-old male with a past medical history of hypertension, nonobstructive CAD, atrial fibrillation on Xarelto, CVA, carotid artery disease status post right CEA, and chronic tobacco use who presented to [facility] ED with weakness and fatigue. ?Wife stated that patient has been feeling very weak over the last?4 to?5 weeks and states that patient had a GI bleed approximately 1 to 2 months ago with a similar presentation which required transfusion at that time. ?Patient did??complain of exertional shortness of breath, increased weakness, and dark stools. ?Patient denied any chest pain,?palpitations, tachycardia, fevers, cough,?loss of consciousness, dizziness, syncope.  Hospital Course  During his stay, patient was found to be septic and had bilateral infiltrates on cxr. Patient was treated for CAP and received IV Rocephin and doxycycline. Patients blood/sputum cultures depicted no growth at 72 hours. These abx were deescalated and resulting CXR was not concerning for infectious process, however was concerning for increasing ILD at both lung bases. It was found that the patients WBC remained stably elevated, and he had a?elevated CRP. To this end, prior to discharge, patient was tested with ZEKE, ANCA, SPEP, UPEP, Rheumatoid Factor, Aldolase, Anti-RO, SSA, SSB, Ck,  and Sjogrens panel. Results of these studies will need to be discussed with the patient at his upcoming appointment with PCP on 9/13/21.  ?  During his stay, patient was found to have melena and anemia. Patient received blood transfusion and his hemoglobin stabilized. Patient was also found to have some signs of iron deficient anemia as well as anemia of chronic disease. Patient was found to be stable for DC with recommendation from Gastroenterology for pill endoscopy study. Patient will follow up with GI in regards to this study in approximately?1-2 weeks. Patients home Xarelto was initially held, however was restarted prior to DC. Patient will be discharged with Protonix PO BID and Ferrous sulfate to be taken once every other day.  ?  During his stay, patient was found to have some acute changes on his EKG. patient has hx of a.fib and was evaluated by Cardiology. Patient was found to have negative troponins and EKG changes were determined to have non-acute findings. Patient was also hemodynamically stable during this period. Patient is discharged with recommendation to follow up with cardiology at upcoming appointment on 10/28/21.  ?  During his stay, it was found that patient was due for his Tdap and Pneumococcal vaccine. Patient will need to receive these with his PCP.  Significant Findings  Labs Last 24 Hours?  ?Chemistry? Hematology/Coagulation?   Sodium Lvl:?135 mmol/L?Low (09/01/21 04:20:00) WBC:?14 x10(3)/mcL?High (09/01/21 04:20:00)   Potassium Lvl: 4.2 mmol/L (09/01/21 04:20:00) RBC:?3.08 x10(6)/mcL?Low (09/01/21 04:20:00)   Chloride: 101 mmol/L (09/01/21 04:20:00) Hgb:?9.8 gm/dL?Low (09/01/21 04:20:00)   CO2: 26 mmol/L (09/01/21 04:20:00) Hct:?30.3 %?Low (09/01/21 04:20:00)   Calcium Lvl: 9.8 mg/dL (09/01/21 04:20:00) Platelet:?510 x10(3)/mcL?High (09/01/21 04:20:00)   Magnesium Lvl: 1.8 mg/dL (09/01/21 04:20:00) MCV: 98.4 fL (09/01/21 04:20:00)   Glucose Lvl: 93 mg/dL (09/01/21 04:20:00) MCH: 31.8 pg  (09/01/21 04:20:00)   BUN:?7.5 mg/dL?Low (09/01/21 04:20:00) MCHC: 32.3 gm/dL (09/01/21 04:20:00)   Creatinine:?0.61 mg/dL?Low (09/01/21 04:20:00) RDW: 14.3 % (09/01/21 04:20:00)   Est Creat Clearance Ser: 127.09 mL/min (09/01/21 05:57:42) MPV: 8.8 fL (09/01/21 04:20:00)   eGFR-AA: >105 (09/01/21 04:20:00) Abs Neut:?11.47 x10(3)/mcL?High (09/01/21 04:20:00)   eGFR-NAMAN: >105 (09/01/21 04:20:00) Neutro Auto: 82 % (09/01/21 04:20:00)   Bili Total: 0.6 mg/dL (09/01/21 04:20:00) Lymph Auto: 8 % (09/01/21 04:20:00)   Bili Direct: 0.4 mg/dL (09/01/21 04:20:00) Mono Auto: 10 % (09/01/21 04:20:00)   Bili Indirect: 0.2 mg/dL (09/01/21 04:20:00) Eos Auto: 0 % (09/01/21 04:20:00)   AST: 34 unit/L (09/01/21 04:20:00) Abs Eos: 0 x10(3)/mcL (09/01/21 04:20:00)   ALT: 42 unit/L (09/01/21 04:20:00) Basophil Auto: 0 % (09/01/21 04:20:00)   Alk Phos: 59 unit/L (09/01/21 04:20:00) Abs Neutro:?11.47 x10(3)/mcL?High (09/01/21 04:20:00)   Total Protein: 6.5 gm/dL (09/01/21 04:20:00) Abs Lymph: 1 x10(3)/mcL (09/01/21 04:20:00)   Albumin Lvl:?2.1 gm/dL?Low (09/01/21 04:20:00) Abs Mono:?1.4 x10(3)/mcL?High (09/01/21 04:20:00)   Globulin:?4.4 gm/dL?High (09/01/21 04:20:00) Abs Baso: 0.1 x10(3)/mcL (09/01/21 04:20:00)   A/G Ratio:?0.5 ratio?Low (09/01/21 04:20:00) NRBC%: 0.0 (09/01/21 04:20:00)   Phosphorus: 3 mg/dL (09/01/21 04:20:00) IG%: 0 % (09/01/21 04:20:00)   Iron Lvl:?18 ug/dL?Low (09/01/21 14:20:00) IG#: 0.05 (09/01/21 04:20:00)   Total CK:?21 U/L?Low (09/01/21 14:20:00)    Time Spent on discharge  35 minutes  Objective  Vitals & Measurements  T:?36.9? ?C (Oral)? TMIN:?36.7? ?C (Oral)? TMAX:?37.1? ?C (Oral)? HR:?104(Peripheral)? HR:?88(Monitored)? RR:?16? BP:?144/88? SpO2:?93%?  Physical Exam  General:?Pleasant?man, comfortably?lying in bed, in no acute distress  Eye: PERRL, EOMI, clear conjunctiva  HENT: Normocephalic, atraumatic, grossly normal hearing  Neck: Supple, Non-tender, No jugular venous distention  Respiratory:?Clear to  auscultation bilaterally, no wheezes, rales, or rhonchi.? No accessory muscle use. ?On room air  Cardiovascular:?Regular rate and rhythm, no murmurs, rubs, or gallops. No peripheral edema  Gastrointestinal:?Soft, nontender, nondistended. ?Bowel sounds present  Musculoskeletal:?Moves all extremities with full range of motion  Integumentary:?dry. No rashes  Neurologic:?Alert and oriented x3. Normal speech. No gross deficits  Psychiatric:?Appropriate mood and affect  Patient Discharge Condition  stable to home  Discharge Disposition  Mr.Kenneth Castro?is being discharged?home.  ?   Activity:?Return to normal activity. Recommend returning to?workin?3?days.  Diet:?Cardiac Diet  ?  Medications:  -Protonix 40 BID provided for?UGI bleed and GI ppx?for?30 days  -Ferric sulfate?every other day provided for?iron deficient anemia?for?30 days  -Continue other medications as previously prescribed  ?   Follow Ups:  -To be seen in [facility] Dm ESPINOZA?on 9/13/21  -Referred to?Gastroenterology Clinic, to be contacted in 1-2 weeks  -Referred to cardiology clinic,?appointment set?for?10/28/2021  -The following labs are to be drawn at the Post [facility] visit: CBC, iron level  ?  At this time, patient is determined to have maximized benefits of IP hospitalization. Patient is discharged in stable condition with OP f/u recommendations and instructions. All questions answered, and patient and caregiver verbalized agreement with the POC. The patient was given return precautions prior to d/c including symptoms that should prompt return to ED or to call PCP. Total time spent of DC of 35 minutes.?  ?   Patient seen and examined,?reviewed with the resident,?agree with?assessment?and?plan.   Discharge Medication Reconciliation  Prescribed  ferrous sulfate (ferrous sulfate 325 mg oral tablet)?325 mg, Oral, Every other day  pantoprazole (Protonix 40 mg ORAL enteric coated tablet)?40 mg, Oral, BID  Continue  aspirin (aspirin 81 mg  oral Delayed Release (EC) tablet)?81 mg, Oral, Daily  atorvastatin (Lipitor 20 mg oral tablet)?20 mg, Oral, Daily  metoprolol (metoprolol succinate 50 mg oral tablet extended release)?50 mg, Oral, Daily  multivitamin with minerals (multivitamin with minerals (Adult Tab))?1 tab(s), Oral, Daily  rivaroxaban (Xarelto 20mg Tablet)?20 mg, Oral, With Supper  Education and Orders Provided  Atrial Fibrillation  Gastrointestinal Bleeding  Discharge - 09/01/21 14:57:00 CDT, Home, discharge after meds to bed, thank you discharge after meds to bed, thank you?  Follow up  Follow up with Cardiology at upcoming appointment on 10/28/21  Follow up with PCP at upcoming appointment on 9/13/21  Follow up with Gastroenterology in 1-2 weeks  Car Seat Challenge  No Qualifying Data

## 2022-09-13 NOTE — HISTORICAL [facility]
This is a historical note converted from Jennifer. Formatting and pictures may have been removed.  Please reference Jennifer for original formatting and attached multimedia. Chief Complaint  pt. AAOx4. pt. presents to ED with abn. lab results from PCP. H&H 8.8 & 28 on 9/13. c/o weakness and SOB. + black stools. reports chest discomfort on R. side when taking a deep breath. pallor at present  Reason for Consultation  melanotic stool  History of Present Illness  67 y/o WM, pMHx HTN, CAD,?CVA, A. fib on Xarelto/ASA, presented to the ED on 9/16/2021 w/ generalized weakness and fatigue x?5 weeks.?Associated mild JIMENEZ and?melanotic stool. ?He is also?complaining of?right-sided chest pain?worse with inspiration.?Recent admission for symptomatic anemia?from 8/27/2021-9/1/2021 requiring transfusion of 2 units PRBCs.?He is scheduled?for an?OP VCE on?9/21/2021.??No fever, chills, nausea, vomiting, dysphagia, dyspepsia, early satiety, abdominal pain, diarrhea, constipation, hematochezia, weight loss. pSHx significant for appendectomy.  ?  Potassium 3.1, calcium 13.3 (corrected 14.4), AST 36, albumin 2.0, PT 17.2, INR 1.42, WBC 18.4, hemoglobin 8.3, hematocrit 26.7, platelet count 682  ?  Smokes 1/2 PPD. Drinks 12 beers/week. Denies illicit drug use, OTC NSAIDS.  ?   Prior endoscopies:  ?   Colonoscopy per Dr. Smalls on 2/19/2021: Normal colon  ?   Colonoscopy per Dr. CHAMP Woodall on 8/12/2020: One 13 mm sessile polyp in the ascending colon removed with snare cautery polypectomy on Eleview lift. Two 4 mm sessile polyps in the ascending colon removed with cold snare polypectomy. Three 3-6 mm sessile polyps in the transverse?colon removed with cold snare polypectomy. Three 3-4?mm sessile polyps in the descending and sigmoid colon removed with cold snare polypectomy.? Descending polyp was not retrieved. One 6 mm sessile polyp in the rectum removed with snare cautery polypectomy. One 30-40 mm sessile polyp in the transverse colon  occupying 25% of the circumference and extending over two folds.? Endoscopic mucosal resection was performed with piecemeal snare?cautery over Eleview lift.? All?pieces were retrieved and place in a separate jar.??A tattoo was placed on the opposite?wall of the polypectomy defect?for future localization. Small internal hemorrhoids.  ?  Path:  ?   1. Sigmoid colon polyp, Polypectomy:  - Tubular adenomatous polyp.  - No evidence of malignancy.  ?  2. Transverse colon polyp, Polypectomy:  - Tubulovillous adenomatous polyp.  - No evidence of malignancy.  ?  3. Ascending colon polyp, Polypectomy:  - Tubulovillous adenomatous polyp.  - No evidence of malignancy.  ?  4. Transverse colon polyp #2, Polypectomy:  - Tubulovillous adenomatous polyp.  - No evidence of malignancy.  ?  5. Rectal polyp, Polypectomy:  - Tubulovillous adenomatous polyp.  - No evidence of malignancy.  ?   EGD per Dr. CHAMP Woodall on 8/12/2020: unremarkable  Review of Systems  Comprehensive Review of Systems performed with no exceptions other than as noted in HPI.  Physical Exam  Vitals & Measurements  T:?36.6? ?C (Oral)? TMIN:?36.4? ?C (Oral)? TMAX:?36.9? ?C (Oral)? HR:?81(Peripheral)? RR:?21? BP:?159/84? SpO2:?97%? WT:?81?kg? BMI:?24.45?  General:?well-developed well-nourished, elderly?WM?in NAD  Eye: PERRLA, EOMI, clear conjunctiva  HENT:? oropharynx without erythema/exudate, oropharynx and nasal mucosal surfaces moist  Neck:? no thyromegaly or lymphadenopathy, trachea midline  Respiratory:?symmetrical chest expansion and respiratory effort, clear to auscultation bilaterally  Cardiovascular:?regular rate and rhythm without murmurs, gallops or rubs  Gastrointestinal:?soft, non-tender, non-distended with normal bowel sounds, without masses to palpation  Integumentary: no rashes or skin lesions present  Neurologic: cranial nerves intact, awake, alert, and oriented  Psych: good insight, appropriate mood, normal affect  ?   Labs Last 24  Hours?  ?Chemistry? Hematology/Coagulation? Blood Gases?   Sodium Lvl: 136 mmol/L (09/17/21 11:36:00) WBC:?19.4 x10(3)/mcL?High (09/17/21 04:54:00) Sample ABG: arterial (09/16/21 20:20:00)   Potassium Lvl: 3.6 mmol/L (09/17/21 11:36:00) RBC:?2.6 x10(6)/mcL?Low (09/17/21 04:54:00) Treatment: nc 2l/ 28% (09/16/21 20:20:00)   Chloride: 98 mmol/L (09/17/21 11:36:00) Hgb:?7.9 gm/dL?Low (09/17/21 04:54:00) Site: Radial Lt (09/16/21 20:20:00)   CO2: 31 mmol/L (09/17/21 11:36:00) Hct:?25.2 %?Low (09/17/21 04:54:00) pH Art:?7.49?High (09/16/21 20:20:00)   Calcium Lvl:?12.9 mg/dL?High (09/17/21 11:36:00) Platelet:?634 x10(3)/mcL?High (09/17/21 04:54:00) pCO2 Art: 42 mmHg (09/16/21 20:20:00)   Glucose Lvl: 91 mg/dL (09/17/21 11:36:00) MCV: 96.9 fL (09/17/21 04:54:00) pO2 Art:?72 mmHg?Low (09/16/21 20:20:00)   BUN: 13.2 mg/dL (09/17/21 11:36:00) MCH: 30.4 pg (09/17/21 04:54:00) HCO3 Art:?32 mmol/L?High (09/16/21 20:20:00)   Creatinine:?0.7 mg/dL?Low (09/17/21 11:36:00) MCHC: 31.3 gm/dL (09/17/21 04:54:00) CO2 Totl Art:?33.3 mmol/L?High (09/16/21 20:20:00)   Est Creat Clearance Ser: 109.72 mL/min (09/17/21 12:06:26) RDW:?14.7 %?High (09/17/21 04:54:00) O2 Sat Art: 96 % (09/16/21 20:20:00)   eGFR-AA: >105 (09/17/21 11:36:00) MPV: 8.8 fL (09/17/21 04:54:00) D base:?7.9?High (09/16/21 20:20:00)   eGFR-NAMAN: >105 (09/17/21 11:36:00) Abs Neut:?16.83 x10(3)/mcL?High (09/17/21 04:54:00) THB ABG:?8.7 gm/dL?Low (09/16/21 20:20:00)   Bili Total: 0.6 mg/dL (09/17/21 11:36:00) Neutro Auto: 86 % (09/17/21 04:54:00) CO Hgb: 1.7 % (09/16/21 20:20:00)   Bili Direct: 0.3 mg/dL (09/17/21 11:36:00) Lymph Auto: 3 % (09/17/21 04:54:00) Met Hgb Art:?1.6 %?High (09/16/21 20:20:00)   Bili Indirect: 0.3 mg/dL (09/17/21 11:36:00) Mono Auto: 9 % (09/17/21 04:54:00) O2 Hgb:?92.8 %?Low (09/16/21 20:20:00)   AST: 22 unit/L (09/17/21 11:36:00) Eos Auto: 0 % (09/17/21 04:54:00) Allens: Positive (09/16/21 20:20:00)   ALT: 17 unit/L (09/17/21 11:36:00) Abs Eos: 0  x10(3)/mcL (09/17/21 04:54:00) ?   Alk Phos: 62 unit/L (09/17/21 11:36:00) Basophil Auto: 0 % (09/17/21 04:54:00) ?   Total Protein: 5.8 gm/dL (09/17/21 11:36:00) Abs Neutro:?16.83 x10(3)/mcL?High (09/17/21 04:54:00) ?   Albumin Lvl:?2 gm/dL?Low (09/17/21 11:36:00) Abs Lymph: 0.6 x10(3)/mcL (09/17/21 04:54:00) ?   Globulin:?3.8 gm/dL?High (09/17/21 11:36:00) Abs Mono:?1.8 x10(3)/mcL?High (09/17/21 04:54:00) ?   A/G Ratio:?0.5 ratio?Low (09/17/21 11:36:00) Abs Baso: 0 x10(3)/mcL (09/17/21 04:54:00) ?   BNP:?304.4 pg/mL?High (09/17/21 07:38:00) NRBC%: 0.0 (09/17/21 04:54:00) ?   Iron Lvl:?11 ug/dL?Low (09/17/21 05:55:00) IG%: 1 % (09/17/21 04:54:00) ?   Transferrin:?144 mg/dL?Low (09/17/21 05:55:00) IG#: 0.14 (09/17/21 04:54:00) ?   TIBC:?172 ug/dL?Low (09/17/21 05:55:00) ? ?   Iron Sat:?6 %?Low (09/17/21 05:55:00) ? ?   Ferritin Lvl: 218.05 ng/mL (09/17/21 11:36:00) ? ?   Vit D 25 OH: 30.4 ng/mL (09/17/21 04:54:00) ? ?   PTH Intact:?8.5 pg/mL?Low (09/17/21 04:54:00) ? ?   UIBC: 161 ug/dL (09/17/21 05:55:00) ? ?   Total CK:?13 U/L?Low (09/16/21 19:17:00) ? ?   CK MB: 0.5 ng/mL (09/16/21 19:17:00) ? ?   Troponin-I: 0.013 ng/mL (09/17/21 07:56:00) ? ?   ?  ?  Accession:?[id number]  Order:?CT Angio Chest W W/O Contrast  Report Dt/Tm:?09/17/2021 13:57  Report:?  CTA CHEST, angiographic technique without and with intravenous  contrast targeting thoracic aorta  2-D reformations and MIPS provided  ?  HISTORY: Other (please specify) ?thoracic aortic aneurysm  ?  As per PQRS measures, all CT scans at this facility used dose  modulation, iterative reconstruction, and/or weight based dose  adjustment when appropriate to reduce radiation dose to as low as  reasonably achievable.  ?  Total DLP 1773 MG Y CM  ?  FINDINGS:  Correlation prior CT 2018  Ascending thoracic aorta diameter up to 4.5 cm similar prior exam. CAD  present. No aortic dissection. Mild aortic plaquing.  ?  Small pericardial effusion and moderate dependent  pleural effusions.  There is bulky mediastinal and right hilar lymphadenopathy.  Right lung nodules reference lesions 11 and 12 mm image 21-22 axial.  There is dependent atelectasis or airspace opacity bilateral.  Underlying centrilobular pulmonary emphysema present. Bronchial wall  thickening greater right lung.  ?  Lytic bony lesion noted at left pedicle mid vertebra with possible  dural involvement as well as posterior left rib lesion with pathologic  fracture and extra osseous soft tissue component sixth rib. Additional  osseous lytic lesion L2 again with possible dural extension  Possible small enhancing right hepatic lesions and possible mesenteric  lymphadenopathy incompletely imaged  ?  IMPRESSION:  Aneurysmal ascending aorta 4.5 cm diameter unchanged since 2018  ?  Findings concerning for metastatic disease: Pulmonary nodules,  mediastinal and right hilar lymphadenopathy, lytic bony lesions,  possible small right hepatic lesions and possible mesenteric  lymphadenopathy incompletely imaged  ?  Right bronchial wall thickening, bilateral dependent pleural effusions  and subjacent atelectasis or airspace opacity  ?   Accession:?[id number]  Order:?XR Chest 1 View  Report Dt/Tm:?09/17/2021 08:36  Report:?  one view of the chest  ?  CPT 06720  ?  HISTORY:  Pneumonia  ?  FINDINGS:  Examination reveals cardiomediastinal silhouette and pleuroparenchymal  changes to be essentially unchanged as compared with the previous exam  ?  IMPRESSION: No significant change  ??  Assessment/Plan  1. ?Normocytic anemia with melanotic stool  ?-Hgb 8.3 -->7.9 -->  ?-Follow Hgb, transfuse Hgb < 7 gm/dL  ?-Iron studies: Iron 11, transferrin 144, TIBC 172, iron saturation 6%, ferritin 218  ?-Clear liquid diet on 9/19/2021  ?-Bowel prep as directed  ?-NPO at 00:01?on 9/20/2021  ?-Plan for VCE on 9/20/2021  ?  2. Electrolyte derangement  ?-Hypokalemia, hypercalcemia,?hypophosphatemia?upon admit  ?-Management per primary  ?  3.  ?Right-sided chest pain/JIMENEZ:  ?-CTA w/ w/o contrast: Findings as above?concerning for metastatic disease   Problem List/Past Medical History  Ongoing  Actinic keratosis of right temple  Anemia  Atrial fibrillation  Basal cell carcinoma  Bulging lumbar disc  Carotid stenosis  Chronic back pain  Colon polyps  Essential hypertension  H/O: CVA  H/O: stroke  Hypercholesterolemia  Hyperlipidemia  Impacted cerumen  Internal hemorrhoid  Left wrist pain  Tobacco user  Historical  Positive fecal occult blood test  Procedure/Surgical History  Ultrasonography of Heart with Aorta (08/27/2021)  Colonoscopy (None) (02/19/2021)  Colonoscopy, flexible; diagnostic, including collection of specimen(s) by brushing or washing, when performed (separate procedure) (02/19/2021)  Inspection of Lower Intestinal Tract, Via Natural or Artificial Opening Endoscopic (02/19/2021)  Colon Tattoo (None) (08/12/2020)  Colonoscopy (None) (08/12/2020)  Esophagogastroduodenoscopy (None) (08/12/2020)  Excision of Ascending Colon, Via Natural or Artificial Opening Endoscopic, Diagnostic (08/12/2020)  Excision of Descending Colon, Via Natural or Artificial Opening Endoscopic, Diagnostic (08/12/2020)  Excision of Rectum, Via Natural or Artificial Opening Endoscopic, Diagnostic (08/12/2020)  Excision of Sigmoid Colon, Via Natural or Artificial Opening Endoscopic, Diagnostic (08/12/2020)  Excision of Transverse Colon, Via Natural or Artificial Opening Endoscopic, Diagnostic (08/12/2020)  Inspection of Upper Intestinal Tract, Via Natural or Artificial Opening Endoscopic (08/12/2020)  Polypectomy (None) (08/12/2020)  Adjacent tissue transfer or rearrangement, eyelids, nose, ears and/or lips; defect 10 sq cm or less (06/28/2019)  Excision of Face Subcutaneous Tissue and Fascia, Open Approach (06/28/2019)  Excision Skin Lesion Wide (None) (06/28/2019)  Transfer Face Subcutaneous Tissue and Fascia with Skin and Subcutaneous Tissue, Open Approach  (06/28/2019)  Carotid Artery Endarterectomy (Right) (05/15/2017)  Extirpation of Matter from Right Common Carotid Artery, Open Approach (05/15/2017)  Supplement Right Common Carotid Artery with Synthetic Substitute, Open Approach (05/15/2017)  Catheter placement in coronary artery(s) for coronary angiography, including intraprocedural injection(s) for coronary angiography, imaging supervision and interpretation; with left heart catheterization including intraprocedural injection(s) for left kinga (03/01/2017)  Fluoroscopy of Left Heart using Low Osmolar Contrast (03/01/2017)  Fluoroscopy of Multiple Coronary Arteries using Low Osmolar Contrast (03/01/2017)  Measurement of Cardiac Sampling and Pressure, Left Heart, Percutaneous Approach (03/01/2017)  Appendectomy  Excision of ganglion of hand  ORIF - Open reduction of fracture of ankle with internal fixation  orif r wrist  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   Medications  Inpatient  aspirin, 81 mg= 1 tab(s), Oral, Daily  azithromycin 500 mg/D5W 250 mL IVPB  cyclobenzaprine 10 mg oral tablet, 5 mg= 0.5 tab(s), Oral, TID  DuoNeb, 3 mL, NEB, q4hr Resp, PRN  ferrous sulfate 325 mg oral tablet, 325 mg= 1 tab(s), Oral, Every other day  Influenza Virus Vaccine, Inactivated, 0.7 mL, IM, Daily  K-Phos Neutral oral tablet, 1 tab(s), Oral, BID  Lasix, 20 mg= 2 mL, IV Push, Once  levoFLOXacin, 500 mg= 1 tab(s), Oral, Daily  Lipitor 20 mg oral tablet, 20 mg= 1 tab(s), Oral, Daily  metoprolol succinate 50 mg oral tablet extended release, 50 mg= 1 tab(s), Oral, Daily  nitroglycerin 0.4 mg sublingual TAB, 0.4 mg= 1 tab(s), SL, q5min, PRN  NS (0.9% Sodium Chloride) Infusion 1,000 mL, 1000 mL, IV  ondansetron, 4 mg= 2 mL, IV Push, One Time, PRN  Protonix, 40 mg= 1 EA, IV Slow, BID  Xarelto, 20 mg= 2 tab(s), Oral, With Supper  Home  aspirin 81 mg oral Delayed Release (EC) tablet, 81 mg= 1 tab(s), Oral, Daily, 3 refills  cyclobenzaprine 5 mg oral tablet, 5 mg= 1 tab(s), Oral, TID  ferrous sulfate  325 mg oral tablet, 325 mg= 1 tab(s), Oral, Every other day  Lipitor 20 mg oral tablet, 20 mg= 1 tab(s), Oral, Daily, 3 refills  metoprolol succinate 50 mg oral tablet extended release, 50 mg= 1 tab(s), Oral, Daily, 3 refills  multivitamin with minerals (Adult Tab), 1 tab(s), Oral, Daily  Protonix 40 mg ORAL enteric coated tablet, 40 mg= 1 tab(s), Oral, BID  Xarelto 20mg Tablet, 20 mg, Oral, With Supper, 11 refills  Allergies  No Known Allergies  Social History  Abuse/Neglect  No, 09/17/2021  No, 09/16/2021  No, 08/27/2021  Alcohol  Current, Beer, 1-2 times per month, 09/13/2021  Employment/School  Retired, 01/31/2017  Exercise  Exercise frequency: Daily. Exercise type: Walking., 11/05/2020  Home/Environment  Lives with Spouse. Living situation: Home/Independent. Alcohol abuse in household: No. Substance abuse in household: No. Smoker in household: Yes. Injuries/Abuse/Neglect in household: No. Feels unsafe at home: No. Safe place to go: Yes. Agency(s)/Others notified: No. Family/Friends available for support: Yes. Concern for family members at home: No. Major illness in household: No. Financial concerns: No. TV/Computer concerns: No. Risks in environment: Pets/Animal exposure., 07/31/2018  Nutrition/Health  Regular, 11/05/2020  Other  Sexual  Sexually active: Yes. Sexual orientation: Straight or heterosexual. Gender Identity Identifies as male., 05/28/2019  Gender Identity Identifies as male., 05/13/2019  Spiritual/Cultural  [place], 11/05/2020  Substance Use  Never, 11/29/2016  Tobacco  5-9 cigarettes (between 1/4 to 1/2 pack)/day in last 30 days, No, 09/17/2021  4 or less cigarettes(less than 1/4 pack)/day in last 30 days, No, 09/16/2021  4 or less cigarettes(less than 1/4 pack)/day in last 30 days, Cigarettes, No, 09/13/2021  Family History  Breast cancer: Mother.  Heart disease: Mother and Father.  Hypertension.: Mother.  Immunizations  Vaccine Date Status Comments   zoster vaccine, inactivated 06/21/2021 Given     COVID-19 mRNA, LNP-S, PF - Moderna 03/30/2021 Recorded    COVID-19 mRNA, LNP-S, PF - Moderna 03/02/2021 Recorded    pneumococcal 23-polyvalent vaccine 11/24/2020 Given    zoster vaccine, inactivated 11/24/2020 Given    influenza virus vaccine, inactivated 11/05/2020 Given    tetanus/diphtheria/pertussis, acel(Tdap) 11/05/2020 Given    influenza virus vaccine, inactivated 11/11/2019 Given    influenza virus vaccine, inactivated 11/08/2018 Given tolerated well   influenza virus vaccine, inactivated 11/30/2016 Given

## 2022-09-13 NOTE — HISTORICAL [facility] CERNER
This is a historical note converted from Jennifer. Formatting and pictures may have been removed.  Please reference Jennifer for original formatting and attached multimedia. Chief Complaint  pt. AAOx4. pt. presents to ED with abn. lab results from PCP. H&H 8.8 & 28 on 9/13. c/o weakness and SOB. + black stools. reports chest discomfort on R. side when taking a deep breath. pallor at present  Reason for Consultation  Rule out?head and neck cancer in the setting of the right?bone lesion positive for poorly differentiated squamous cell carcinoma  History of Present Illness  66-year-old male with pertinent past medical history of renal cell carcinoma?of his ear nose?as well as?carotid artery stenosis status post CEA?hypertension hypercholesterolemia?stroke?and significant tobacco use?who is currently admitted to the hospital undergoing work-up?for?fatigue.? Per wife patient started having fatigue and weight loss about 20 pounds?starting a few months ago. ?She became ill for which she cared for her. ?He was delayed in getting?work-up for his fatigue.? They were admitted?at the end of July?and were diagnosed with anemia and underwent blood transfusions?followed by further lab work-up but nothing was?diagnosed at that time. ?Was seen in outpatient clinic?which a CT chest was ordered patient was admitted prior to CT scan being performed.? CT scan?chest was obtained which showed significant?supraclavicular?lymphadenopathy?as well as mediastinal lymphadenopathy.? There was also a lytic right rib lesion that was biopsied which came back as poorly differentiated squamous cell carcinoma.? ENT was consulted at that at that time for possible head and neck cancer?in the setting of?squamous cell carcinoma.? CT neck was obtained which did not demonstrate any obvious mucosal masses?but does also have?lytic scapula and lytic?spinal lesions as well as?in addition to lower?jugulodigastric lymphadenopathy.  ?  Patient notes that he has been  hoarse over the last few weeks. ?He also notes that his difficulty from the swallowing he has to swallow slower. ?He also feels like he coughs and that his food is going down the line.? He denies any trismus or obvious?lumps in the neck.? He does note a little lump?in the?left?hairline. ?Denies coughing up any blood.? Feels short of breath but has been weaned from his oxygen?down to nasal cannula.  Review of Systems  negative, except as above  Physical Exam  Vitals & Measurements  T:?36.5? ?C (Oral)? TMIN:?35.9? ?C (Axillary)? TMAX:?36.6? ?C (Axillary)? HR:?118(Peripheral)? RR:?22? BP:?119/88? SpO2:?95%?  General: AAO NAD lethargic  Neuro: CN II - XII grossly intact  Head/ Face: AT NC, symmetric, sensations intact bilaterally  Eye: EOMI PERRLA  Ears: externally normal with grossly normal hearing  Nose: bilateral nares patent, midline septum, no rhinorrhea, no external deformity  OC/OP: MMM, dentition is poor no intraoral lesions, diffuse scattered?white lesions within the oral cavity?on tonsillar pillars consistent with?candidiasis versus food particles?able to scrape off with tongue biting. ?Tonsils 1+ soft no obvious lesions. ?FOM/BOT soft, no trismus, no uvular deviation  Neck: soft, supple, right-sided palpable?level 4?lymphadenopathy?difficult to?feel?left-sided.? Palpable bilateral supraclavicular lymphadenopathy.  Respiratory: nonlabored,?active wheezing. ?On nasal cannula  Cardiovascular: RRR  ?  ?   Procedure: Flexible laryngoscopy  Surgeon-Debo Ramos MD  Anesthetic-topical  ?   Findings:?No obvious changes noted throughout the?nasopharynx including?S of Rosenmuller, base of tongue, vallecula, epiglottis, AE folds, arytenoids, false,?true vocal cords, subglottis, piriform sinuses  Assessment/Plan  A-fib?I48.91  Abnormal diagnostic test?[id number]  Anemia?D64.9  Bilateral pleural effusion?J90  Carotid stenosis?I65.29  H/O:  CVA?Z86.79  Hypercalcemia?E83.52  Hypertension?I10  Unspecified severe protein-calorie malnutrition?E43  Weakness or fatigue?[id number]  ?66-year-old male with new onset of?supraclavicular, lower cervical, mediastinal lymphadenopathy and associated bony lytic lesions?concerning for?unknown primary malignancy.? Rib biopsy consistent with?poorly differentiated squamous cell carcinoma. ?Patient and wife was unaware of this?at time of discussion.? Discussed with patient squamous cell carcinoma diagnosis?and likely?malignancy that is?has not been determined.? Flexible laryngoscopy did not demonstrate?any obvious masses?or primary lesion?which is consistent with the CT neck which also did not read any mucosal abnormalities.? Would recommend continued work-up with possible lung biopsy?for?other possible sources.  ?  Plan  --No evidence of any head or neck?malignancy on flexible laryngoscopy, physical exam,?CT neck  --Remainder of work-up per primary team  --Do not hesitate to contact the ENT on-call team for any further questions or concerns  ?  ?  ?  ?  Patients history and physical exam were reviewed with the resident. ?Agree with findings and proposed plan.  ?   Simba James M.D.   Problem List/Past Medical History  Ongoing  Actinic keratosis of right temple  Anemia  Atrial fibrillation  Basal cell carcinoma  Bulging lumbar disc  Carotid stenosis  Chronic back pain  Colon polyps  Essential hypertension  H/O: CVA  H/O: stroke  Hypercholesterolemia  Hyperlipidemia  Impacted cerumen  Internal hemorrhoid  Left wrist pain  Tobacco user  Historical  Positive fecal occult blood test  Procedure/Surgical History  M2A Capsule Endoscopy (09/20/2021)  Ultrasonography of Heart with Aorta (08/27/2021)  Colonoscopy (None) (02/19/2021)  Colonoscopy, flexible; diagnostic, including collection of specimen(s) by brushing or washing, when performed (separate procedure) (02/19/2021)  Inspection of Lower  Intestinal Tract, Via Natural or Artificial Opening Endoscopic (02/19/2021)  Colon Tattoo (None) (08/12/2020)  Colonoscopy (None) (08/12/2020)  Esophagogastroduodenoscopy (None) (08/12/2020)  Excision of Ascending Colon, Via Natural or Artificial Opening Endoscopic, Diagnostic (08/12/2020)  Excision of Descending Colon, Via Natural or Artificial Opening Endoscopic, Diagnostic (08/12/2020)  Excision of Rectum, Via Natural or Artificial Opening Endoscopic, Diagnostic (08/12/2020)  Excision of Sigmoid Colon, Via Natural or Artificial Opening Endoscopic, Diagnostic (08/12/2020)  Excision of Transverse Colon, Via Natural or Artificial Opening Endoscopic, Diagnostic (08/12/2020)  Inspection of Upper Intestinal Tract, Via Natural or Artificial Opening Endoscopic (08/12/2020)  Polypectomy (None) (08/12/2020)  Adjacent tissue transfer or rearrangement, eyelids, nose, ears and/or lips; defect 10 sq cm or less (06/28/2019)  Excision of Face Subcutaneous Tissue and Fascia, Open Approach (06/28/2019)  Excision Skin Lesion Wide (None) (06/28/2019)  Transfer Face Subcutaneous Tissue and Fascia with Skin and Subcutaneous Tissue, Open Approach (06/28/2019)  Carotid Artery Endarterectomy (Right) (05/15/2017)  Extirpation of Matter from Right Common Carotid Artery, Open Approach (05/15/2017)  Supplement Right Common Carotid Artery with Synthetic Substitute, Open Approach (05/15/2017)  Catheter placement in coronary artery(s) for coronary angiography, including intraprocedural injection(s) for coronary angiography, imaging supervision and interpretation; with left heart catheterization including intraprocedural injection(s) for left kinga (03/01/2017)  Fluoroscopy of Left Heart using Low Osmolar Contrast (03/01/2017)  Fluoroscopy of Multiple Coronary Arteries using Low Osmolar Contrast (03/01/2017)  Measurement of Cardiac Sampling and Pressure, Left Heart, Percutaneous Approach (03/01/2017)  Appendectomy  Excision of ganglion of  hand  ORIF - Open reduction of fracture of ankle with internal fixation  orif r wrist  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   Medications  Inpatient  aspirin, 81 mg= 1 tab(s), Oral, Daily  cyclobenzaprine 10 mg oral tablet, 5 mg= 0.5 tab(s), Oral, TID  docusate sodium 100 mg oral capsule, 100 mg= 1 cap(s), Oral, BID  DuoNeb, 3 mL, NEB, BID  ferrous sulfate 325 mg oral tablet, 325 mg= 1 tab(s), Oral, Every other day  Flomax 0.4 mg oral capsule, 0.4 mg= 1 cap(s), Oral, Daily  gabapentin 300 mg oral capsule, 300 mg= 1 cap(s), Oral, At Bedtime  heparin 5000 units/mL injectable solution, 5000 units= 1 mL, Subcutaneous, On Call  labetalol 5 mg/mL intravenous solution, 5 mg= 1 mL, IV Push, q2hr, PRN  lidocaine 1% injectable solution, 20 mL, Subcutaneous, As Directed, PRN  Lidocaine 5% Topical Patch, 1 patch(es), TD, Daily  Lipitor 20 mg oral tablet, 20 mg= 1 tab(s), Oral, Daily  methylnaltrexone 12 mg/0.6 mL subcutaneous solution, 12 mg= 0.6 mL, Subcutaneous, Every other day, PRN  metoprolol succinate 50 mg oral tablet extended release, 50 mg= 1 tab(s), Oral, Daily  MiraLax (polyethylene glycol 3350), 17 gm= 1 packet(s), Oral, BID  morphine 2 mg/mL preservative-free injectable solution, 2 mg= 1 mL, IV, q4hr, PRN  nitroglycerin 0.4 mg sublingual TAB, 0.4 mg= 1 tab(s), SL, q5min, PRN  Carver Milk of Magnesia, 30 mL, Oral, Once, PRN  potassium phosphate-sodium phosphate 250 mg-280 mg-160 mg oral powder for reconstitution, 1 tab(s), Oral, BID  Protonix, 40 mg= 1 EA, IV Slow, BID  sodium chloride 1 g oral tablet, 1000 mg= 1 tab(s), Oral, Daily  Solumedrol IV push / IM, 30 mg= 0.75 mL, IV Push, Daily  Tessalon Perles, 100 mg= 1 cap(s), Oral, TID, PRN  traZODone, 50 mg= 1 tab(s), Oral, Once a day (at bedtime)  Xarelto, 20 mg= 2 tab(s), Oral, At Bedtime  Zosyn 3.375gm/50ml premix, 3.375 gm= 50 mL, IV Piggyback, q8hr  Home  aspirin 81 mg oral Delayed Release (EC) tablet, 81 mg= 1 tab(s), Oral, Daily, 3 refills  ferrous sulfate 325 mg oral  tablet, 325 mg= 1 tab(s), Oral, Every other day  Lipitor 20 mg oral tablet, 20 mg= 1 tab(s), Oral, Daily, 3 refills  metoprolol succinate 50 mg oral tablet extended release, 50 mg= 1 tab(s), Oral, Daily, 3 refills  multivitamin with minerals (Adult Tab), 1 tab(s), Oral, Daily  Protonix 40 mg ORAL enteric coated tablet, 40 mg= 1 tab(s), Oral, BID  Xarelto 20mg Tablet, 20 mg, Oral, With Supper, 11 refills  Allergies  No Known Allergies  Social History  Abuse/Neglect  No, 09/17/2021  No, 09/16/2021  No, 08/27/2021  Alcohol  Current, Beer, 1-2 times per month, 09/13/2021  Employment/School  Retired, 01/31/2017  Exercise  Exercise frequency: Daily. Exercise type: Walking., 11/05/2020  Home/Environment  Lives with Spouse. Living situation: Home/Independent. Alcohol abuse in household: No. Substance abuse in household: No. Smoker in household: Yes. Injuries/Abuse/Neglect in household: No. Feels unsafe at home: No. Safe place to go: Yes. Agency(s)/Others notified: No. Family/Friends available for support: Yes. Concern for family members at home: No. Major illness in household: No. Financial concerns: No. TV/Computer concerns: No. Risks in environment: Pets/Animal exposure., 07/31/2018  Nutrition/Health  Regular, 11/05/2020  Other  Sexual  Sexually active: Yes. Sexual orientation: Straight or heterosexual. Gender Identity Identifies as male., 05/28/2019  Gender Identity Identifies as male., 05/13/2019  Spiritual/Cultural  Spiritism, 11/05/2020  Substance Use  Never, 11/29/2016  Tobacco  5-9 cigarettes (between 1/4 to 1/2 pack)/day in last 30 days, No, 09/17/2021  4 or less cigarettes(less than 1/4 pack)/day in last 30 days, No, 09/16/2021  4 or less cigarettes(less than 1/4 pack)/day in last 30 days, Cigarettes, No, 09/13/2021  Family History  Breast cancer: Mother.  Heart disease: Mother and Father.  Hypertension.: Mother.  Immunizations  Vaccine Date Status Comments   influenza virus vaccine, inactivated - Not Given Vaccine  Unavailable     tuberculin purified protein derivative 09/18/2021 Given    zoster vaccine, inactivated 06/21/2021 Given    COVID-19 mRNA, LNP-S, PF - Moderna 03/30/2021 Recorded    COVID-19 mRNA, LNP-S, PF - Moderna 03/02/2021 Recorded    pneumococcal 23-polyvalent vaccine 11/24/2020 Given    zoster vaccine, inactivated 11/24/2020 Given    influenza virus vaccine, inactivated 11/05/2020 Given    tetanus/diphtheria/pertussis, acel(Tdap) 11/05/2020 Given    influenza virus vaccine, inactivated 11/11/2019 Given    influenza virus vaccine, inactivated 11/08/2018 Given tolerated well   influenza virus vaccine, inactivated 11/30/2016 Given

## 2022-09-13 NOTE — HISTORICAL [facility]ER
This is a historical note converted from Ceranu. Formatting and pictures may have been removed.  Please reference Ceranu for original formatting and attached multimedia. Chief Complaint  weak, dizzinnes, diaphoretic, hx of blood transfusions and colon polyps. Also reports possible broken rib on L side.  Reason for Consultation  symptomatic anemia, melena  History of Present Illness  65 y/o WM, pMHx HTN, CAD,?CVA, A. fib on Xarelto/ASA, presented to the ED on 8/27/2021 w/ generalized weakness and fatigue x 2 weeks. Associated mild JIMENEZ and?melanotic stool. Similar symptoms in August 2020 requiring admission and transfusion. No fever, chills, nausea, vomiting, dysphagia, dyspepsia, abdominal pain, hematochezia, weight loss. pSHx significant for appendectomy.  ?  ED workup notable for WBC 13.4, lactic acid 2.4, , H/H 8.7/26.8, PT 25, INR 2.3, PTT 47.6, CXR w/ BLL interstitial opacities.  ?  Smokes 1/2 PPD. Drinks 12 beers/week. Denies illicit drug use, OTC NSAIDS.  ?  Prior endoscopies:  ?  Colonoscopy per Dr. Smalls on 2/19/2021: Normal colon  ?  Colonoscopy per Dr. CHAMP Woodall on 8/12/2020: One 13 mm sessile polyp in the ascending colon removed with snare cautery polypectomy on Eleview lift. Two 4 mm sessile polyps in the ascending colon removed with cold snare polypectomy. Three 3-6 mm sessile polyps in the transverse?colon removed with cold snare polypectomy. Three 3-4?mm sessile polyps in the descending and sigmoid colon removed with cold snare polypectomy.? Descending polyp was not retrieved. One 6 mm sessile polyp in the rectum removed with snare cautery polypectomy. One 30-40 mm sessile polyp in the transverse colon occupying 25% of the circumference and extending over two folds.? Endoscopic mucosal resection was performed with piecemeal snare?cautery over Eleview lift.? All?pieces were retrieved and place in a separate jar.??A tattoo was placed on the opposite?wall of the polypectomy defect?for future  localization. Small internal hemorrhoids.  ?   Path:  ?  1. Sigmoid colon polyp, Polypectomy:  - Tubular adenomatous polyp.  - No evidence of malignancy.  ?  2. Transverse colon polyp, Polypectomy:  - Tubulovillous adenomatous polyp.  - No evidence of malignancy.  ?  3. Ascending colon polyp, Polypectomy:  - Tubulovillous adenomatous polyp.  - No evidence of malignancy.  ?  4. Transverse colon polyp #2, Polypectomy:  - Tubulovillous adenomatous polyp.  - No evidence of malignancy.  ?  5. Rectal polyp, Polypectomy:  - Tubulovillous adenomatous polyp.  - No evidence of malignancy.  ?  EGD per Dr. CHAMP Woodall on 8/12/2020: unremarkable  Review of Systems  Comprehensive Review of Systems performed with no exceptions other than as noted in HPI.  Physical Exam  Vitals & Measurements  T:?36.8? ?C (Oral)? TMIN:?36.8? ?C (Oral)? TMAX:?37.5? ?C (Oral)? HR:?96(Monitored)? RR:?16? BP:?125/77? SpO2:?96%?  General:?well-developed well-nourished, elderly?WM?in NAD  Eye: PERRLA, EOMI, clear conjunctiva  HENT:? oropharynx without erythema/exudate, oropharynx and nasal mucosal surfaces moist  Neck:? no thyromegaly or lymphadenopathy, trachea midline  Respiratory:?symmetrical chest expansion and respiratory effort, clear to auscultation bilaterally  Cardiovascular:?regular rate and rhythm without murmurs, gallops or rubs  Gastrointestinal:?soft, non-tender, non-distended with normal bowel sounds, without masses to palpation  Integumentary: no rashes or skin lesions present  Neurologic: cranial nerves intact, awake, alert, and oriented  Psych: good insight, appropriate mood, normal affect  ?  Labs Last 24 Hours?  ?Chemistry? Hematology/Coagulation?   Sodium Lvl:?133 mmol/L?Low (08/30/21 06:44:00) WBC:?12.6 x10(3)/mcL?High (08/30/21 06:44:00)   Potassium Lvl: 4.4 mmol/L (08/30/21 06:44:00) RBC:?2.39 x10(6)/mcL?Low (08/30/21 06:44:00)   Chloride: 101 mmol/L (08/30/21 06:44:00) Hgb:?7.5 gm/dL?Low (08/30/21 06:44:00)   CO2: 24 mmol/L  (08/30/21 06:44:00) Hct:?23.6 %?Low (08/30/21 06:44:00)   Calcium Lvl: 8.9 mg/dL (08/30/21 06:44:00) Platelet:?466 x10(3)/mcL?High (08/30/21 06:44:00)   Magnesium Lvl: 1.7 mg/dL (08/30/21 06:44:00) MCV: 98.7 fL (08/30/21 06:44:00)   Glucose Lvl: 96 mg/dL (08/30/21 06:44:00) MCH: 31.4 pg (08/30/21 06:44:00)   BUN: 9.4 mg/dL (08/30/21 06:44:00) MCHC: 31.8 gm/dL (08/30/21 06:44:00)   Creatinine:?0.55 mg/dL?Low (08/30/21 06:44:00) RDW: 13 % (08/30/21 06:44:00)   Est Creat Clearance Ser: 140.96 mL/min (08/30/21 08:19:29) MPV: 8.8 fL (08/30/21 06:44:00)   eGFR-AA: >105 (08/30/21 06:44:00) Abs Neut:?10 x10(3)/mcL?High (08/30/21 06:44:00)   eGFR-NAMAN: >105 (08/30/21 06:44:00) Neutro Auto: 79 % (08/30/21 06:44:00)   Bili Total: 0.4 mg/dL (08/30/21 06:44:00) Lymph Auto: 9 % (08/30/21 06:44:00)   Bili Direct: 0.2 mg/dL (08/30/21 06:44:00) Mono Auto: 10 % (08/30/21 06:44:00)   Bili Indirect: 0.2 mg/dL (08/30/21 06:44:00) Eos Auto: 0 % (08/30/21 06:44:00)   AST:?60 unit/L?High (08/30/21 06:44:00) Abs Eos: 0 x10(3)/mcL (08/30/21 06:44:00)   ALT: 44 unit/L (08/30/21 06:44:00) Basophil Auto: 0 % (08/30/21 06:44:00)   Alk Phos: 50 unit/L (08/30/21 06:44:00) Abs Neutro:?10 x10(3)/mcL?High (08/30/21 06:44:00)   Total Protein: 5.9 gm/dL (08/30/21 06:44:00) Abs Lymph: 1.2 x10(3)/mcL (08/30/21 06:44:00)   Albumin Lvl:?2 gm/dL?Low (08/30/21 06:44:00) Abs Mono: 1.3 x10(3)/mcL (08/30/21 06:44:00)   Globulin:?3.9 gm/dL?High (08/30/21 06:44:00) Abs Baso: 0 x10(3)/mcL (08/30/21 06:44:00)   A/G Ratio:?0.5 ratio?Low (08/30/21 06:44:00) NRBC%: 0.0 (08/30/21 06:44:00)   Phosphorus: 2.8 mg/dL (08/30/21 06:44:00) IG%: 1 % (08/30/21 06:44:00)   Ferritin Lvl: 177.8 ng/mL (08/30/21 06:44:00) IG#: 0.07 (08/30/21 06:44:00)   CRP:?15.39 mg/dL?High (08/30/21 10:27:00) Sed Rate:?124 mm/hr?High (08/30/21 06:44:00)   Troponin-I: <0.010 (08/30/21 10:27:00)    Assessment/Plan  1.?Symptomatic?anemia w/ melanotic stool  ?-Hgb 8.7 --> 7.6 --> 7.5 --> 7.6 --> 7.5  --> 7.5 --> transfused 2u PRBCs -->  ?-Iron Studies: Iron 13, transferrin 160, TIBC 185, Iron Sat 7%, ferritin 177  ?-B12 1,241, Folate 12.4  ?-Transfuse Hgb < 8 gm/dL given CAD  ?-Continue PPI  ?-NPO p MN   Problem List/Past Medical History  Ongoing  Actinic keratosis of right temple  Anemia  Atrial fibrillation  Basal cell carcinoma  Bulging lumbar disc  Carotid stenosis  Chronic back pain  Colon polyps  Essential hypertension  H/O: CVA  H/O: stroke  Hypercholesterolemia  Hyperlipidemia  Impacted cerumen  Internal hemorrhoid  Left wrist pain  Tobacco user  Historical  Positive fecal occult blood test  Procedure/Surgical History  Colonoscopy (None) (02/19/2021)  Colonoscopy, flexible; diagnostic, including collection of specimen(s) by brushing or washing, when performed (separate procedure) (02/19/2021)  Inspection of Lower Intestinal Tract, Via Natural or Artificial Opening Endoscopic (02/19/2021)  Colon Tattoo (None) (08/12/2020)  Colonoscopy (None) (08/12/2020)  Esophagogastroduodenoscopy (None) (08/12/2020)  Excision of Ascending Colon, Via Natural or Artificial Opening Endoscopic, Diagnostic (08/12/2020)  Excision of Descending Colon, Via Natural or Artificial Opening Endoscopic, Diagnostic (08/12/2020)  Excision of Rectum, Via Natural or Artificial Opening Endoscopic, Diagnostic (08/12/2020)  Excision of Sigmoid Colon, Via Natural or Artificial Opening Endoscopic, Diagnostic (08/12/2020)  Excision of Transverse Colon, Via Natural or Artificial Opening Endoscopic, Diagnostic (08/12/2020)  Inspection of Upper Intestinal Tract, Via Natural or Artificial Opening Endoscopic (08/12/2020)  Polypectomy (None) (08/12/2020)  Adjacent tissue transfer or rearrangement, eyelids, nose, ears and/or lips; defect 10 sq cm or less (06/28/2019)  Excision of Face Subcutaneous Tissue and Fascia, Open Approach (06/28/2019)  Excision Skin Lesion Wide (None) (06/28/2019)  Transfer Face Subcutaneous Tissue and Fascia with Skin and  Subcutaneous Tissue, Open Approach (06/28/2019)  Carotid Artery Endarterectomy (Right) (05/15/2017)  Extirpation of Matter from Right Common Carotid Artery, Open Approach (05/15/2017)  Supplement Right Common Carotid Artery with Synthetic Substitute, Open Approach (05/15/2017)  Catheter placement in coronary artery(s) for coronary angiography, including intraprocedural injection(s) for coronary angiography, imaging supervision and interpretation; with left heart catheterization including intraprocedural injection(s) for left kinga (03/01/2017)  Fluoroscopy of Left Heart using Low Osmolar Contrast (03/01/2017)  Fluoroscopy of Multiple Coronary Arteries using Low Osmolar Contrast (03/01/2017)  Measurement of Cardiac Sampling and Pressure, Left Heart, Percutaneous Approach (03/01/2017)  Appendectomy  Excision of ganglion of hand  ORIF - Open reduction of fracture of ankle with internal fixation  orif r wrist  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   Medications  Inpatient  aspirin, 81 mg= 1 tab(s), Oral, Daily  doxycycline 100 mg intravenous injection  DuoNeb, 3 mL, NEB, q4hr, PRN  ferrous sulfate 325 mg oral tablet, 325 mg= 1 tab(s), Oral, Every other day  Lipitor 20 mg oral tablet, 20 mg= 1 tab(s), Oral, Daily  metoprolol succinate 50 mg oral tablet extended release, 50 mg= 1 tab(s), Oral, Daily  Multiple Vitamins oral tablet, 1 tab(s), Oral, Daily  Protonix 40 mg Vial (IV Push), 40 mg= 1 EA, IV Slow, BID  Rocephin 2 g injection  Sodium Chloride 0.9% 1000mL 1,000 mL, 1000 mL, IV  Tylenol, 650 mg= 2 tab(s), Oral, q8hr, PRN  Xarelto, 20 mg= 2 tab(s), Oral, qPM  Zofran, 4 mg= 2 mL, IV Push, q4hr, PRN  Home  aspirin 81 mg oral Delayed Release (EC) tablet, 81 mg= 1 tab(s), Oral, Daily, 3 refills  Lipitor 20 mg oral tablet, 20 mg= 1 tab(s), Oral, Daily, 3 refills  metoprolol succinate 50 mg oral tablet extended release, 50 mg= 1 tab(s), Oral, Daily, 3 refills  multivitamin with minerals (Adult Tab), 1 tab(s), Oral, Daily  Xarelto 20mg  Tablet, 20 mg, Oral, With Supper, 11 refills  Allergies  No Known Allergies  Social History  Abuse/Neglect  No, 08/27/2021  Alcohol  Current, Beer, 1-2 times per week, 02/19/2021  Employment/School  Retired, 01/31/2017  Exercise  Exercise frequency: Daily. Exercise type: Walking., 11/05/2020  Home/Environment  Lives with Spouse. Living situation: Home/Independent. Alcohol abuse in household: No. Substance abuse in household: No. Smoker in household: Yes. Injuries/Abuse/Neglect in household: No. Feels unsafe at home: No. Safe place to go: Yes. Agency(s)/Others notified: No. Family/Friends available for support: Yes. Concern for family members at home: No. Major illness in household: No. Financial concerns: No. TV/Computer concerns: No. Risks in environment: Pets/Animal exposure., 07/31/2018  Nutrition/Health  Regular, 11/05/2020  Other  Sexual  Sexually active: Yes. Sexual orientation: Straight or heterosexual. Gender Identity Identifies as male., 05/28/2019  Gender Identity Identifies as male., 05/13/2019  Spiritual/Cultural  Ep[employer]opalian, 11/05/2020  Substance Use  Never, 11/29/2016  Tobacco  10 or more cigarettes (1/2 pack or more)/day in last 30 days, Cigarettes, N/A, 08/27/2021  Family History  Breast cancer: Mother.  Heart disease: Mother and Father.  Hypertension.: Mother.  Immunizations  Vaccine Date Status Comments   zoster vaccine, inactivated 06/21/2021 Given    COVID-19 mRNA, LNP-S, PF - Moderna 03/30/2021 Recorded    COVID-19 mRNA, LNP-S, PF - Moderna 03/02/2021 Recorded    pneumococcal 23-polyvalent vaccine 11/24/2020 Given    zoster vaccine, inactivated 11/24/2020 Given    influenza virus vaccine, inactivated 11/05/2020 Given    tetanus/diphtheria/pertussis, acel(Tdap) 11/05/2020 Given    influenza virus vaccine, inactivated 11/11/2019 Given    influenza virus vaccine, inactivated 11/08/2018 Given tolerated well   influenza virus vaccine, inactivated 11/30/2016 Given

## 2022-09-13 NOTE — HISTORICAL [facility]
This is a historical note converted from Ceranu. Formatting and pictures may have been removed.  Please reference Ceranu for original formatting and attached multimedia. Chief Complaint  c/o of generalized weakness, SOB. Recent dx with metastatic CA of unknown orgin. D/c from inpatient stay on 10/1/2021.  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 fib, history of CVA in the past, HTN presented to [facility] ED on 10/3 with chief complaint of S OB, wheezing, O2 sat at home ranging mid to high 80s.? Of note patient was recently discharged from hospital on 10/1.? He was hospitalized from 9/16-10/1 for generalized weakness, fatigue, weight loss about 25 pounds.? Further work-up revealed multiple mets probably with primary lung cancer.? During the hospital course he also had thoracentesis x2.? Had mild recurrent pleural effusions likely secondary to underlying malignancy which were responsive to Lasix hence was sent home with p.o. Lasix.  There was also a concern HAP 2 days prior to discharge hence obtained MRSA PCR, respiratory PCR which were negative and his WBC counts were trending down.? Patient work with PT and was able to ambulate on room air with O2 sats more than 90% he was hemodynamically and clinically stable hence was discharged on 10/1 appropriate regimen.? Patient states that he did well on the day of discharge but however the next day he developed wheezing, SOB with O2 sats noted him mid to high 80s.? Contacted his home health nurse who recommended to go to ER for further evaluation.  Upon arrival in the ED patient had a chest x-ray which showed prominent left basilar density with bilateral costophrenic angle blunting concerning for small bilateral pleural effusions.? He was hence further admitted to IM team for possible  HAP  Review of Systems  Constitutional:?no fever, fatigue, weakness  Respiratory:?no cough,?+ wheezing,+shortness of breath  Cardiovascular:?no chest pain, no palpitations, no edema  Gastrointestinal:?no nausea, vomiting, or diarrhea. No abdominal pain  Genitourinary:?no dysuria, no urinary frequency or urgency, no hematuria  Integumentary:?no skin rash or abnormal lesion  Neurologic: no headache, no dizziness, no weakness or numbness  Physical Exam  Vitals & Measurements  T:?36.9? ?C (Oral)? TMIN:?36.5? ?C (Oral)? TMAX:?36.9? ?C (Oral)? HR:?120(Peripheral)? RR:?26? BP:?129/75? SpO2:?97%? WT:?81.1?kg? BMI:?156.44?  Gen: well-nourished, well-developed?in mild distress  HEENT: PERRLA, EOMI, oral mucosa appears moist  Neck: No JVD or carotid bruits. No thyromegaly. No lymphadenopathy.  Heart: RRR, no murmurs, gallops, clicks or rubs.  Lungs: CTAB without rales, wheezes or rhonchi. Normal work of breathing. Chest rise symmetrical on inspiration.  Abd: Soft, non-tender, non-distended and without guarding. No organomegaly. No obvious masses. Bowel sounds present.  Extremities: Radial and pedal pulses 2+ bilaterally, no LE edema.  MSK: No obvious deformities. Moves all extremities purposefully.  Neuro: AAO x4,?no focal neurological deficits, strength 5 x 5 in all?extremities, sensation intact  Skin: Warm, dry and without rashes.  ?  Assessment/Plan  Concern for possible HAP.  -Increased left basilar opacity on chest x-ray obtained this?a.m.? Also bilateral?pleural effusions?noted.? WBC count remained stable at 25,000?when compared to?previous admission.? Increased WBC count secondary to infection versus reactive to?cancer.  -Started on broad-spectrum antibiotics vancomycin, cefepime, azithromycin.  -MRSA PCR, respiratory PCR, respiratory culture sent, to de-escalate antibiotics per cultures.? Also ordered CRP?, procalcitonin levels  -If any further worsening?to consider?CT thorax for further evaluation of pleural  effusions.  -To consider?diagnostic?thoracentesis?for further evaluation?if not resolving with antibiotics.  ?  Poorly differentiated squamous cell carcinoma with unknown primary.  Patient recently had work-up for?generalized fatigue,?weakness, about 25 pound weight loss.? CT thorax, abdomen?consistent with?pulmonary nodules, hepatic mets,?multiple osteolytic lesions.? Obtain MRI brain with and without contrast which cause concerning for?skull mets.  Discussed case with?oncology, pulmonary,?likely consistent with primary lung cancer. ?Father to follow-up?outpatient with?oncology?in regards to?further options.  Per patient and his wifes request?wanted to know more about palliative care/hospice. ?Consulted case management?to initiate hospice/palliative care talk.  Has severe bone pain?secondary to?osteolytic lesions?from cancer. ?On multimodal pain regimen?with Norco, lidocaine patches?will continue.  ?  A. fib.  To continue metoprolol succinate?50 mg daily, [id number] more than 2?on anticoagulation with?Xarelto?20 mg daily.  ?  HTN?  We will?restart?on?home meds.  ?  Hyperlipidemia.  To continue home atorvastatin 20 mg daily.  History of symptomatic anemia secondary to small GI bleed  H&H on this admission at 8.5/27.? No acute active bleeding.? To continue to monitor H&H for now no transfusion needed at this time.  To continue Protonix 40 mg daily.  ?  Malnourishment  Consult nutrition in a.m.?for?nutritional supplements.  To continue Remeron for appetite.  ?  Physical debilitation.  Consult physical therapy in a.m. tomorrow, needs?PT while inpatient.  ?  Dispo:?Patient with metastatic cancer?admitted for possible HAP?started on broad-spectrum antibiotics. ?Admitted to telemetry with monitoring?due to history of A. fib?and CVAs in the past.  ?  Faculty addendum:  I have reviewed the patients history, residents ?findings on physical examination, diagnosis and treatment plan. Care provided was reasonable and  necessary.  ?  see addendum to PN from today  ?   Allergies  No Known Allergies  Family History  Breast cancer: Mother.  Heart disease: Mother and Father.  Hypertension.: Mother.  Lab Results  Labs Last 24 Hours?  ?Chemistry? Hematology/Coagulation? Blood Gases?   Sodium Lvl:?134 mmol/L?Low (10/03/21 11:51:00) WBC:?23.5 x10(3)/mcL?High (10/03/21 11:51:00) Sample ABG: arterial (10/03/21 09:42:00)   Potassium Lvl: 3.7 mmol/L (10/03/21 11:51:00) RBC:?2.94 x10(6)/mcL?Low (10/03/21 11:51:00) Treatment: nc 2lpm (10/03/21 09:42:00)   Chloride:?96 mmol/L?Low (10/03/21 11:51:00) Hgb:?8.5 gm/dL?Low (10/03/21 11:51:00) Site: Radial Rt (10/03/21 09:42:00)   CO2: 24 mmol/L (10/03/21 11:51:00) Hct:?27.6 %?Low (10/03/21 11:51:00) pH Art: 7.4 (10/03/21 09:42:00)   Calcium Lvl: 9.3 mg/dL (10/03/21 11:51:00) Platelet: 296 x10(3)/mcL (10/03/21 11:51:00) pCO2 Art:?47 mmHg?High (10/03/21 09:42:00)   Glucose Lvl: 107 mg/dL (10/03/21 11:51:00) MCV: 93.9 fL (10/03/21 11:51:00) pO2 Art:?75 mmHg?Low (10/03/21 09:42:00)   BUN: 12.4 mg/dL (10/03/21 11:51:00) MCH: 28.9 pg (10/03/21 11:51:00) HCO3 Art:?29.1 mmol/L?High (10/03/21 09:42:00)   Creatinine:?0.57 mg/dL?Low (10/03/21 11:51:00) MCHC:?30.8 gm/dL?Low (10/03/21 11:51:00) CO2 Totl Art:?30.5 mmol/L?High (10/03/21 09:42:00)   eGFR-AA: >105 (10/03/21 11:51:00) RDW:?15.3 %?High (10/03/21 11:51:00) O2 Sat Art: 96.1 % (10/03/21 09:42:00)   eGFR-NAMAN: >105 (10/03/21 11:51:00) MPV: 9.1 fL (10/03/21 11:51:00) D base:?3.6?High (10/03/21 09:42:00)   Bili Total: 0.6 mg/dL (10/03/21 11:51:00) Abs Neut:?21.25 x10(3)/mcL?High (10/03/21 11:51:00) THB AB gm/dL (10/03/21 09:42:00)   Bili Direct: 0.4 mg/dL (10/03/21 11:51:00) Neutro Auto: 90 % (10/03/21 11:51:00) CO Hgb: 1.9 % (10/03/21 09:42:00)   Bili Indirect: 0.2 mg/dL (10/03/21 11:51:00) Lymph Auto: 2 % (10/03/21 11:51:00) Met Hgb Art: 0.6 % (10/03/21 09:42:00)   AST: 26 unit/L (10/03/21 11:51:00) Mono Auto: 7 % (10/03/21 11:51:00) O2 Hgb:?93.7 %?Low  (10/03/21 09:42:00)   ALT: 16 unit/L (10/03/21 11:51:00) Abs Eos: 0 x10(3)/mcL (10/03/21 11:51:00) Allens: Positive (10/03/21 09:42:00)   Alk Phos: 91 unit/L (10/03/21 11:51:00) Basophil Auto: 0 % (10/03/21 11:51:00)    Total Protein: 6.5 gm/dL (10/03/21 11:51:00) Abs Neutro:?21.25 x10(3)/mcL?High (10/03/21 11:51:00)    Albumin Lvl:?1.8 gm/dL?Low (10/03/21 11:51:00) Abs Lymph:?0.4 x10(3)/mcL?Low (10/03/21 11:51:00)    Globulin:?4.7 gm/dL?High (10/03/21 11:51:00) Abs Mono:?1.7 x10(3)/mcL?High (10/03/21 11:51:00)    A/G Ratio:?0.4 ratio?Low (10/03/21 11:51:00) Abs Baso: 0 x10(3)/mcL (10/03/21 11:51:00)    Lactic Acid Lvl:?2.4 mmol/L?Critical (10/03/21 13:35:00) NRBC%: 0.0 (10/03/21 11:51:00)    CRP:?25.99 mg/dL?High (10/03/21 12:25:00) IG%: 1 % (10/03/21 11:51:00)    Troponin-I: <0.010 (10/03/21 13:35:00) IG#: 0.19 (10/03/21 11:51:00)    Diagnostic Results  Accession:?[id number]  Order:?XR Chest 1 View  Report Dt/Tm:?10/03/2021 09:58  Report:?  one view of the chest  ?  CPT 91732  ?  HISTORY:  Dyspnea  ?  FINDINGS:  Examination reveals cardiomediastinal silhouette is within normal  limits.  ?  Prominent interstitial markings again identified more confluent at the  left base superimposed infiltrate cannot be completely excluded.  ?  There is blunting of both costophrenic angles which may indicate the  presence of bilateral pleural effusions  ?  There is increased left retrocardiac density and silhouetting of the  left hemidiaphragm although it might be related to pleural fluid it  might also represent an infiltrate/atelectases?  ?  No other focal consolidative changes no other interval change  ?  IMPRESSION:  ?  Prominent interstitial and airspace opacities at the left base this  might be related to a superimposed infiltrate.  ?  Increased left retrocardiac density and silhouetting of the left  hemidiaphragm as above  ?  Bilateral pleural effusions  ?  No other interval change  ?

## 2022-09-13 NOTE — HISTORICAL [facility] CERNER
This is a historical note converted from Jennifer. Formatting and pictures may have been removed.  Please reference [facility] for original formatting and attached multimedia. Subjective  Mr. Castro is a 66-year-old male with PMHx significant for HTN, A. fib on Xarelto, previous CVA, nonobstructive CAD as well as carotid artery disease s/p right-sided carotid endarterectomy who was most recently admitted on 9/16/2021 to [facility] internal medicine services for treatment of acute worsening shortness of breath, hypercalcemia of unknown origin.? Of note, patient was also recently admitted to [facility] internal medicine services from 8/28-9/1/21 at which time he was treated for symptomatic anemia requiring transfusion.? Labs on presentation from this admission were significant for an uncorrected calcium of 13.3 which corrected to approximately 14.9.? ABG on admission significant for pH 7.49, PCO2 42, PO2 72, bicarb 32.? Chest x-ray on admission significant for interval increase of bilateral pleural effusions, patient underwent thoracentesis.? CT angio of the chest on 9/17/2021 was concerning for findings consistent with metastatic disease with pulmonary nodules, mediastinal and right hilar lymphadenopathy, lytic bony lesions as well as possible hepatic lesions and mesenteric lymphadenopathy.? CT of the abdomen and pelvis on 9/21 also concerning for L2 lytic osseous lesion as well as other concerning findings.? In regards to hypercalcemia, patient was treated with IVF as well as calcitonin and zoledronic acid.? Calcium has been slowly decreasing, while findings concerning for hyponatremia and/or SIADH have resulted over the last several days.??In reviewing?previous labs?from different encounters, appears that over the past several months, patient has become progressively more hypercalcemic?with worsening electrolyte derangements as well as?hypoalbuminemia.  ?   [facility] nephrology services consulted for?assistance with electrolyte derangements  in the setting of possible?SIADH, possibly 2/2?malignancy.  ?   Overnight/interval: Patient and spouse both report that patient slept well overnight, primary team?adjusted pain regiment?including lidocaine patch in regards to low back and?upper thoracic rib pain.? Had a lengthy discussion this morning with patient and his wife?in regards to?current findings and the need for further diagnostics.? They both agree and are willing to?undergo further diagnostic treatments including MRI?as well as IR biopsy?(which is pending today)  Review of Systems  Constitutional:?no fever, fatigue, weakness  Respiratory:?no cough, no wheezing, no shortness of breath?at rest with supplemental oxygen  Cardiovascular:?no chest pain, no palpitations, no edema  Gastrointestinal:?no nausea, vomiting, or diarrhea. No abdominal pain  Genitourinary:?no dysuria, no urinary frequency or urgency, no hematuria  Musculoskeletal:?Significant low back, upper thoracic pains alleviated with current pain regimen  Integumentary:?no skin rash or abnormal lesion  Neurologic: no headache, no dizziness  ?  Objective  Vitals & Measurements  T:?36.4? ?C (Oral)? TMIN:?36.2? ?C (Oral)? TMAX:?36.8? ?C (Axillary)? HR:?108(Peripheral)? RR:?20? BP:?135/86? SpO2:?95%?  Physical Exam  General:?elderly appearing male in mild respiratory distress  Respiratory:?coarse breath sounds bilaterally, no respiratory distress on supplemental oxygen, able to speak in full sentences  Cardiovascular:?regular rate and rhythm without murmurs, gallops or rubs, no peripheral edema  Gastrointestinal:?soft, mild tenderness to palpation, non-distended? without masses to palpation  Musculoskeletal:?no obvious deformities, no LE peripheral edema  Integumentary: pale appearing skin, upper extremity ecchymosis  Neurologic: no signs of peripheral neurological deficit, patient answers questions appropriately  Lab Results  Labs Last 24 Hours?  ?Chemistry? Hematology/Coagulation?   Sodium  Lvl:?131 mmol/L?Low (09/23/21 03:50:00) PT:?17.5 second(s)?High (09/23/21 03:50:00)   Potassium Lvl:?3.4 mmol/L?Low (09/23/21 03:50:00) INR:?1.46?High (09/23/21 03:50:00)   Chloride:?97 mmol/L?Low (09/23/21 03:50:00) WBC:?19.9 x10(3)/mcL?High (09/23/21 03:50:00)   CO2: 26 mmol/L (09/23/21 03:50:00) RBC:?3.12 x10(6)/mcL?Low (09/23/21 03:50:00)   Calcium Lvl: 8.8 mg/dL (09/23/21 03:50:00) Hgb:?9.2 gm/dL?Low (09/23/21 03:50:00)   Magnesium Lvl: 2.3 mg/dL (09/23/21 03:50:00) Hct:?28.7 %?Low (09/23/21 03:50:00)   Glucose Lvl:?130 mg/dL?High (09/23/21 03:50:00) Platelet:?479 x10(3)/mcL?High (09/23/21 03:50:00)   BUN: 9.5 mg/dL (09/23/21 03:50:00) MCV: 92 fL (09/23/21 03:50:00)   Creatinine:?0.71 mg/dL?Low (09/23/21 03:50:00) MCH: 29.5 pg (09/23/21 03:50:00)   Est Creat Clearance Ser: 108.17 mL/min (09/23/21 05:33:09) MCHC: 32.1 gm/dL (09/23/21 03:50:00)   BUN/Creat Ratio: 11 (09/22/21 20:59:00) RDW:?15.1 %?High (09/23/21 03:50:00)   eGFR-AA: >105 (09/23/21 03:50:00) MPV: 8.7 fL (09/23/21 03:50:00)   eGFR-NAMAN: >105 (09/23/21 03:50:00) Abs Neut:?18.82 x10(3)/mcL?High (09/23/21 03:50:00)   Bili Total: 1.3 mg/dL (09/23/21 03:50:00) Neutro Auto: 94 % (09/23/21 03:50:00)   Bili Direct:?0.9 mg/dL?High (09/23/21 03:50:00) Lymph Auto: 2 % (09/23/21 03:50:00)   Bili Indirect: 0.4 mg/dL (09/23/21 03:50:00) Mono Auto: 3 % (09/23/21 03:50:00)   AST:?40 unit/L?High (09/23/21 03:50:00) Eos Auto: 0 % (09/23/21 03:50:00)   ALT: 18 unit/L (09/23/21 03:50:00) Abs Eos: 0 x10(3)/mcL (09/23/21 03:50:00)   Alk Phos: 76 unit/L (09/23/21 03:50:00) Basophil Auto: 0 % (09/23/21 03:50:00)   Total Protein: 6.2 gm/dL (09/23/21 03:50:00) Abs Neutro:?18.82 x10(3)/mcL?High (09/23/21 03:50:00)   Albumin Lvl:?1.8 gm/dL?Low (09/23/21 03:50:00) Abs Lymph:?0.4 x10(3)/mcL?Low (09/23/21 03:50:00)   Globulin:?4.4 gm/dL?High (09/23/21 03:50:00) Abs Mono: 0.5 x10(3)/mcL (09/23/21 03:50:00)   A/G Ratio:?0.4 ratio?Low (09/23/21 03:50:00) Abs Baso: 0 x10(3)/mcL  (09/23/21 03:50:00)   Phosphorus: 3 mg/dL (09/23/21 03:50:00) NRBC%: 0.0 (09/23/21 03:50:00)    IG%: 1 % (09/23/21 03:50:00)    IG#: 0.14 (09/23/21 03:50:00)   Assessment/Plan  SIADH, 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   -SIADH?likely secondary to malignancy, with?low serum osmolality?and urine osmolality >100, uric acid low at 2.2  -Renal indices this a.m.: BUN/creatinine 9.5/0.71  -, K3.4, CL 97, bicarb 26  -In regards to hypercalcemia?with appropriately suppressed PTH, patient has undergone treatment including IVF as well as calcitonin, zoledronic acid with good response  -Agree with further workup for malignancy in order to properly disposition patient and future treatment; IR biopsy?tentatively planned for today  -Awaiting MRI brain?for?metastatic evaluation, unable to complete due to?PillCam  ?   -Recommend continued close electrolyte monitoring and repletion  -agree with salt tabs/sodium repletion as indicated, currently on potassium phosphate-sodium phosphate twice daily  -Recommend?further discussion with family in regards to?goals and wishes?moving forward  -Nephrology services will remain in consultation, though?may possibly?sign off if patient remains stable tomorrow  ?   Thank you for this consultation?allowing us to participate in the care of this patient, staff addendum to follow  ?  Staff addendum:  I saw and evaluated the patient. I agree with the findings and the plan of care as documented in the resident?s note.? Phosphorus improved yesterday with 30 mmol of sodium phosphate.? Patient received additional potassium today and patient is on potassium phosphate supplementation.? We will continue to monitor electrolyte and volume status closely.  Cheryl Daniel M.D.  ?

## 2022-09-13 NOTE — HISTORICAL [facility] CERNER
This is a historical note converted from Jennifer. Formatting and pictures may have been removed.  Please reference Jennifer for original formatting and attached multimedia. Reason for Consultation  melena, anemia  History of Present Illness  This 65-year-old? male with a history of atrial fibrillation on Xarelto 20 mg daily?and aspirin 81 mg daily (currently on hold), CVA, hypertension, hyperlipidemia,?alcohol use,?and tobacco use is hospitalized for GI bleed.? He reports intermittent melena for the past few months, 2-3 times per week, with associated fatigue and weakness that had been progressively worsening per his wifes report.? His wife reported noticing dark maroon colored blood in his stools for the first time yesterday morning.? He had lab work with his NP in internal medicine clinic yesterday that revealed chloride 110, GFR 89, globulin 3.90, and otherwise unremarkable CMP; iron 14, transferrin 327, TIBC 431, iron saturation 3.2%, ferritin 4.8; TSH 0.782; hemoglobin 7.4, hematocrit 25.7, MCH 24.3, MCHC 28.8, RDW 17.2, and otherwise unremarkable CBC; reticulocyte count 2.2.? FOBT was positive.? Hemoglobin and hematocrit September 13, 2019?were 14.2 and 43.7; May 4, 2020?were 11.3 and 35.7.? He was?recommended ED evaluation.  ?  He presented to the ED at [facility] for further?evaluation of anemia and +FOBT.? Upon arrival to ED, blood pressure was elevated at 157/95 mmHg and pulse rate was 115 bpm. ?Vital signs were otherwise stable.??Physical exam was unremarkable.? Medicine was consulted for GI bleed.? PT 21.0, INR 1.87, PTT 34.3;?COVID-19 rapid negative.? GI was consulted for melena.? At time of consultation, he reported feeling well.? He reported a fair appetite and that his weight fluctuated between 3-5 pounds.? He denied dizziness, lightheadedness, weakness, diaphoresis, fever, chills, nausea, vomiting, chest pain, shortness of breath, cough, sputum production,?odynophagia, dysphagia, acid reflux,  heartburn, early satiety, or abdominal pain.? Bowel habits are described as formed to soft stools once daily without constipation, diarrhea, hematochezia, fecal urgency, fecal incontinence, or pain with defecation.? He was started on oral iron supplementation and pantoprazole 40 mg IV daily.??Most recent hemoglobin and hematocrit?this morning was 6.7 and 23.2. ?He was transfused with 1 unit PRBC.  ?   He denies ever having an EGD or colonoscopy done.? He smokes 10 to 15 cigarettes daily?and drinks a few beers daily.? He denies a family history of IBD, colon polyps, or colon cancer.  Review of Systems  Negative except as noted in the HPI.  Physical Exam  Vitals & Measurements  T:?37? ?C (Oral)? TMIN:?36.7? ?C (Oral)? TMAX:?37? ?C (Oral)? HR:?70(Monitored)? RR:?20? BP:?141/86? SpO2:?100%? WT:?86.1?kg? BMI:?25.05?  General:?well-developed, well-nourished, in no acute distress  Eye: PERRLA, EOMI, clear conjunctiva, eyelids normal  HENT:?oropharynx without erythema/exudate, oropharynx and nasal mucosal surfaces moist  Neck: full range of motion, no lymphadenopathy  Respiratory:?clear to auscultation bilaterally  Cardiovascular:?regular rate and rhythm without murmurs, gallops or rubs  Gastrointestinal:?soft, non-tender, non-distended with normal bowel sounds, without masses to palpation  Musculoskeletal:?full range of motion of all extremities/spine without limitation or discomfort  Integumentary: no rashes or skin lesions present  Neurologic: motor/sensory function intact  ?   Assessment/Plan  Melena  Occult blood in stool  Normocytic anemia  Iron deficiency anemia  Tobacco use  Alcohol abuse  ?  FOBT positive August 10, 2020  Iron 14, transferrin 327, TIBC 431, iron saturation 3.2%, ferritin 4.8 August 10, 2020  Hgb 7.4--6.7--1 unit PRBC  Hemoglobin and hematocrit September 13, 2019?were 14.2 and 43.7; May 4, 2020?were 11.3 and 35.7.  Continue iron supplementation and PPI treatment as directed  Monitor stools for  bleeding  Monitor hemoglobin, transfuse for hemoglobin <7 g/dL  Will proceed with EGD and colonoscopy tomorrow, NPO after midnight  Recommend tobacco and alcohol cessation   Problem List/Past Medical History  Ongoing  Atrial fibrillation  Basal cell carcinoma  Benign essential HTN  Carotid stenosis  Essential hypertension  H/O: CVA  H/O: stroke  Hypercholesterolemia  Hyperlipidemia  Left wrist pain  Positive fecal occult blood test  Tobacco user  Historical  No qualifying data  Procedure/Surgical History  Adjacent tissue transfer or rearrangement, eyelids, nose, ears and/or lips; defect 10 sq cm or less (06/28/2019)  Excision of Face Subcutaneous Tissue and Fascia, Open Approach (06/28/2019)  Excision Skin Lesion Wide (None) (06/28/2019)  Transfer Face Subcutaneous Tissue and Fascia with Skin and Subcutaneous Tissue, Open Approach (06/28/2019)  Carotid Artery Endarterectomy (Right) (05/15/2017)  Extirpation of Matter from Right Common Carotid Artery, Open Approach (05/15/2017)  Supplement Right Common Carotid Artery with Synthetic Substitute, Open Approach (05/15/2017)  Catheter placement in coronary artery(s) for coronary angiography, including intraprocedural injection(s) for coronary angiography, imaging supervision and interpretation; with left heart catheterization including intraprocedural injection(s) for left kinga (03/01/2017)  Fluoroscopy of Left Heart using Low Osmolar Contrast (03/01/2017)  Fluoroscopy of Multiple Coronary Arteries using Low Osmolar Contrast (03/01/2017)  Measurement of Cardiac Sampling and Pressure, Left Heart, Percutaneous Approach (03/01/2017)  Appendectomy  Excision of ganglion of hand  ORIF - Open reduction of fracture of ankle with internal fixation  orif r wrist  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   Medications  Inpatient  acetaminophen, 500 mg= 1 tab(s), Oral, q6hr, PRN  acetaminophen, 500 mg= 1 tab(s), Oral, q6hr, PRN  Dextrose 50% and Water (50 mL vial/syringe), 12.5 gm= 25 mL, IV Push,  Once, PRN  Dextrose 50% and Water (50 mL vial/syringe), 12.5 gm= 25 mL, IV Push, As Directed, PRN  Dextrose 50% and Water (50 mL vial/syringe), 25 gm= 50 mL, IV Push, As Directed, PRN  Dextrose 50% in Water intravenous solution, 12.5 gm= 25 mL, IV Push, As Directed, PRN  glucagon recombinant 1 mg injection, 1 mg= 1 EA, IM, q10min, PRN  glucagon recombinant 1 mg injection, 1 mg= 1 EA, IM, q10min, PRN  glucose 40% oral gel, 15 gm= 0.5 tube(s), Oral, As Directed, PRN  insulin lispro 100 units/mL subcutaneous injection, 2-14 units, Subcutaneous, As Directed, PRN  IVF Lactated Ringers LR Infusion 1,000 mL, 1000 mL, IV  labetalol, 20 mg= 4 mL, IV Push, q2hr, PRN  Lipitor 20 mg oral tablet, 20 mg= 1 tab(s), Oral, Daily  Protonix, 40 mg= 1 EA, IV Slow, Daily  Home  aspirin 81 mg oral Delayed Release (EC) tablet, 81 mg= 1 tab(s), Oral, Daily, 3 refills  Lipitor 20 mg oral tablet, 20 mg= 1 tab(s), Oral, Daily, 3 refills  metoprolol succinate 50 mg oral tablet extended release, 50 mg= 1 tab(s), Oral, Daily, 3 refills  multivitamin with minerals (Adult Tab), 1 tab(s), Oral, Daily  Xarelto 20mg Tablet, 20 mg, Oral, With Supper, 11 refills  Allergies  No Known Allergies  Social History  Abuse/Neglect  No, 08/10/2020  No, 07/09/2020  Alcohol  Current, Beer, 1-2 times per week, 5 drinks/episode average., 02/17/2017  Employment/School  Retired, 01/31/2017  Home/Environment  Lives with Spouse. Living situation: Home/Independent. Alcohol abuse in household: No. Substance abuse in household: No. Smoker in household: Yes. Injuries/Abuse/Neglect in household: No. Feels unsafe at home: No. Safe place to go: Yes. Agency(s)/Others notified: No. Family/Friends available for support: Yes. Concern for family members at home: No. Major illness in household: No. Financial concerns: No. TV/Computer concerns: No. Risks in environment: Pets/Animal exposure., 07/31/2018  Other  Sexual  Sexually active: Yes. Sexual orientation: Straight or  heterosexual. Gender Identity Identifies as male., 05/28/2019  Gender Identity Identifies as male., 05/13/2019  Substance Use  Never, 11/29/2016  Tobacco  4 or less cigarettes(less than 1/4 pack)/day in last 30 days, No, 08/10/2020  10 or more cigarettes (1/2 pack or more)/day in last 30 days, Cigarettes, No, 10 per day. Household tobacco concerns: No. 18 Years (Age started)., 07/09/2020  Family History  Breast cancer: Mother.  Heart disease: Mother and Father.  Hypertension.: Mother.  Immunizations  Vaccine Date Status Comments   influenza virus vaccine, inactivated 11/11/2019 Given    influenza virus vaccine, inactivated 11/08/2018 Given tolerated well   influenza virus vaccine, inactivated 11/30/2016 Given    Lab Results  Labs Last 72 Hours?  ?Chemistry? Hematology/Coagulation?   Sodium Lvl: 142 mmol/L (08/11/20 03:50:00) PT:?21 second(s)?High (08/10/20 14:52:00)   Potassium Lvl: 4.1 mmol/L (08/11/20 03:50:00) INR:?1.87?High (08/10/20 14:52:00)   Chloride:?112 mmol/L?High (08/11/20 03:50:00) PTT: 34.3 second(s) (08/10/20 14:52:00)   CO2: 26 mmol/L (08/11/20 03:50:00) WBC: 6.4 x10(3)/mcL (08/11/20 03:50:00)   Calcium Lvl:?8.3 mg/dL?Low (08/11/20 03:50:00) RBC:?2.73 x10(6)/mcL?Low (08/11/20 03:50:00)   Glucose Lvl: 90 mg/dL (08/11/20 03:50:00) Hgb:?6.7 gm/dL?Critical (08/11/20 03:50:00)   BUN: 11 mg/dL (08/11/20 03:50:00) Hct:?23.2 %?Low (08/11/20 03:50:00)   Creatinine: 0.8 mg/dL (08/11/20 03:50:00) Platelet: 262 x10(3)/mcL (08/11/20 03:50:00)   eGFR-AA: >105 (08/11/20 03:50:00) MCV: 85 fL (08/11/20 03:50:00)   eGFR-NAMAN: 102 mL/min (08/11/20 03:50:00) MCH:?24.5 pg?Low (08/11/20 03:50:00)   Bili Total: 0.3 mg/dL (08/11/20 03:50:00) MCHC:?28.9 gm/dL?Low (08/11/20 03:50:00)   Bili Direct: 0.1 mg/dL (08/11/20 03:50:00) RDW:?17.3 %?High (08/11/20 03:50:00)   Bili Indirect: 0.2 mg/dL (08/11/20 03:50:00) MPV: 9.5 fL (08/11/20 03:50:00)   AST:?13 unit/L?Low (08/11/20 03:50:00) Abs Neut: 3.24 x10(3)/mcL (08/11/20 03:50:00)    ALT: 13 unit/L (20 03:50:00) Neutro Auto: 50 % (20 03:50:00)   Alk Phos: 60 unit/L (20 03:50:00) Lymph Auto: 33 % (20 03:50:00)   Total Protein:?6.2 gm/dL?Low (20 03:50:00) Mono Auto: 13 % (20 03:50:00)   Albumin Lvl:?3 gm/dL?Low (20 03:50:00) Eos Auto: 2 % (20 03:50:00)   Globulin: 3.2 gm/mL (20 03:50:00) Abs Eos: 0.2 x10(3)/mcL (20 03:50:00)   A/G Ratio:?0.9 ratio?Low (20 03:50:00) Basophil Auto: 1 % (20 03:50:00)   TSH: 0.782 mIU/L (08/10/20 11:58:00) Abs Neutro: 3.24 x10(3)/mcL (20 03:50:00)   POC CB mg/dL (20 11:46:00) Abs Lymph: 2.1 x10(3)/mcL (20 03:50:00)   Blood Glucose, POC:?107 mg/dL?High (08/10/20 21:00:00) Abs Mono: 0.8 x10(3)/mcL (20 03:50:00)    Abs Baso: 0 x10(3)/mcL (20 03:50:00)    IG%: 0 % (20 03:50:00)    IG#: 0.01 (20 03:50:00)   Diagnostic Results  Accession:?[id number]  Order:?XR Chest 1 View  Report Dt/Tm:?08/10/2020 18:11  Report:?  EXAMINATION  XR Chest 1 View  ?  INDICATION  Congestion  ?  Comparison:   ?  FINDINGS  Lines/tubes/devices:  None present.  ?  The cardiomediastinal silhouette and central pulmonary vasculature are  unremarkable for AP projection.  The trachea is midline. There is no lobar consolidation or significant  pleural effusion. No definite pneumothorax is appreciated.  ?  There is no acute osseous or extrathoracic abnormality.  ?  IMPRESSION  No acute cardiopulmonary process.  ?      Patient seen and examined in conjunction with the ALEXIS Perez on? .? I actively participated in all aspects of todays patient encounter along with the NP, and agree with the assessment and plan as outlined above. ?  ?   Intermittent melena x many months per?patient.? no abd pain or weight loss.? Never had colonoscopy or egd.? on Xarelto for a fib.? Will proceed with EGD and colonoscopy tomorrow.  ?

## 2022-09-13 NOTE — HISTORICAL [facility] CERNER
This is a historical note converted from Jennifer. Formatting and pictures may have been removed.  Please reference [employer] for original formatting and attached multimedia. Chief Complaint  weak, dizzinnes, diaphoretic, hx of blood transfusions and colon polyps. Also reports possible broken rib on L side.  Reason for Consultation  Shortness of Breath  History of Present Illness  Deepak Castro 58 y.o.?male with a past medical history of hypertension, nonobstructive CAD, atrial fibrillation on Xarelto, CVA, carotid artery disease status post right CEA, and chronic tobacco use who presents to [facility] ED with weakness and fatigue. ?Wife states that patient has been feeling very weak over the last?4 to?5 weeks and states that patient had a GI bleed approximately 1 year ago with a similar presentation which required transfusion at that time. ?Patient does complain of exertional shortness of breath, increased weakness, and dark stools. ?Patient denies any chest pain,?palpitations, tachycardia, fevers, cough,?loss of consciousness, dizziness, syncope. In the ED heart rate 86, respiratory rate 22, blood pressure 103/71. ?BNP is slightly elevated at 340.1. ?Lactic acid 2.4. ?PT 25, INR 2.3, PTT 47.6. ?H/H 8.7/26.8. ?Upon admission patient meets?sepsis criteria?with a heart rate of 103,?WBC?of 13,?and?possible source of infection in the lung.? Started on ceftriaxone, azithromycin.? Admitted for?management?of CAP,?UGI bleed. He was transfused 2 units of PRBC yesterday and his H/H increased from 7.5/23/6-->9.8/29.8. His most recent LHC in 2017 following an abnormal Lexiscan showed 25-30% occlusions in most coronary vessels without need for intervention at the time. He has not complained of any cardiac symptoms aside from SOB and JIMENEZ which?were likely 2/2 his profound anemia. Troponin negative and EKG noted with T-wave inversions change from previous.  ?   Today Mr. Castro states that he is feeling much better following the transfusion,  he has more energy and notes a significant decreased in his SOB, though has not exerted himself much yet. He briefly ambulated from his old to new hospital room, and states he did not have any JIMENEZ at the time. Orthostatics were performed during exam, and were negative as well as no SOB during. He denies any chest pain, palpitations, JIMENEZ, n/v, abdominal pain, dizziness, or syncope. Given his significant anemia and improvement in symptoms following transfusion, he does not need further cardiac workup at this time and can be seen outpatient.  Review of Systems  Constitutional:?no fever, +fatigue and +generalized weakness x3 weeks  Eye:?no vision loss, eye redness, drainage, or pain  ENMT:?no sore throat, ear pain, sinus pain/congestion, nasal congestion/drainage  Respiratory:?no cough, no wheezing, + increased shortness of breath and?+JIMENEZ x2 weeks  Cardiovascular:?no chest pain, no palpitations, no edema  Gastrointestinal:?no nausea, vomiting, or diarrhea. No abdominal pain. +melena x3 weeks  Genitourinary:?no dysuria, no urinary frequency or urgency, no hematuria  Hema/Lymph:?no abnormal bruising or bleeding  Endocrine:?no heat or cold intolerance, no excessive thirst or excessive urination  Musculoskeletal:?no muscle or joint pain, no joint swelling  Integumentary:?no skin rash or abnormal lesion  Neurologic: no headache, no dizziness, no weakness or numbness  ?  Physical Exam  Vitals & Measurements  T:?36.7? ?C (Oral)? TMIN:?36.5? ?C (Oral)? TMAX:?37.8? ?C (Oral)? HR:?89(Peripheral)? RR:?18? BP:?157/98? BP:?123/77(Sitting)? BP:?122/81(Standing)? BP:?131/79(Supine)? SpO2:?96%?  General:?well-developed well-nourished in no acute distress  Eye: PERRLA, EOMI, clear conjunctiva, eyelids normal  HENT:?AT/NC, MMM  Neck: full range of motion, no thyromegaly or lymphadenopathy  Respiratory:?clear to auscultation bilaterally, normal respiratory effort  Cardiovascular:?regular rate and rhythm without murmurs, gallops or rubs,  bilateral radial and DP pulses 2+  Gastrointestinal:?soft, non-tender, non-distended with normal bowel sounds, without masses to palpation  Genitourinary: no CVA tenderness to palpation  Musculoskeletal:?full range of motion of all extremities/spine without limitation or discomfort  Integumentary: no rashes or skin lesions present  Neurologic: cranial nerves intact, no signs of peripheral neurological deficit, motor/sensory function intact  ?  Assessment/Plan  Increased SOB  -likely 2/2 symptomatic anemia given H/H 7.5/23.6--> 9.8/29.8 after 2 units PRBC transfused  -SOB and JIMENEZ improved post transfusion  -TTE showed EF 60%  -given 2017 [facility] with non-obstructive CAD and symptomatic SOB?improvement?post-transfusion, no need for further cardiac workup at this time  -okay to follow up in clinic and will investigate if cardiac symptoms of worsening disease arise  ?  Non-obstructive CAD  -troponin negative, EKG largely unchanged  -continue ASA 81 mg daily, metoprolol, and atorvastatin  ?  Chronic A-fib w/hx CVA  -continue Xarelto 20 mg qPM  -continue metoprolol succinate 50 mg daily  ?  HTN  -continue metoprolol as above  ?  HLD  -continue adorvastatin 20 mg daily  ?  ?  ?  ?  ?  Cardiology attendings addendum  This patient was seen and evaluated by me, and?was discussed with the?other provider(s) and/or trainee(s) on the Cardiology Service.? Please see the?above?note for further details.??  ?  Mr. Castro has history of very mild nonobstructive CAD.? He presented with GI bleed, but had no chest pain, and negative troponin. Patient does not need cardiac work-up at this point, unless symptoms raise cardiac concern.? Patient can f/u in cardiology clinic.   Problem List/Past Medical History  Ongoing  Actinic keratosis of right temple  Anemia  Atrial fibrillation  Basal cell carcinoma  Bulging lumbar disc  Carotid stenosis  Chronic back pain  Colon polyps  Essential hypertension  H/O: CVA  H/O:  stroke  Hypercholesterolemia  Hyperlipidemia  Impacted cerumen  Internal hemorrhoid  Left wrist pain  Tobacco user  Historical  Positive fecal occult blood test  Procedure/Surgical History  Colonoscopy (None) (02/19/2021)  Colonoscopy, flexible; diagnostic, including collection of specimen(s) by brushing or washing, when performed (separate procedure) (02/19/2021)  Inspection of Lower Intestinal Tract, Via Natural or Artificial Opening Endoscopic (02/19/2021)  Colon Tattoo (None) (08/12/2020)  Colonoscopy (None) (08/12/2020)  Esophagogastroduodenoscopy (None) (08/12/2020)  Excision of Ascending Colon, Via Natural or Artificial Opening Endoscopic, Diagnostic (08/12/2020)  Excision of Descending Colon, Via Natural or Artificial Opening Endoscopic, Diagnostic (08/12/2020)  Excision of Rectum, Via Natural or Artificial Opening Endoscopic, Diagnostic (08/12/2020)  Excision of Sigmoid Colon, Via Natural or Artificial Opening Endoscopic, Diagnostic (08/12/2020)  Excision of Transverse Colon, Via Natural or Artificial Opening Endoscopic, Diagnostic (08/12/2020)  Inspection of Upper Intestinal Tract, Via Natural or Artificial Opening Endoscopic (08/12/2020)  Polypectomy (None) (08/12/2020)  Adjacent tissue transfer or rearrangement, eyelids, nose, ears and/or lips; defect 10 sq cm or less (06/28/2019)  Excision of Face Subcutaneous Tissue and Fascia, Open Approach (06/28/2019)  Excision Skin Lesion Wide (None) (06/28/2019)  Transfer Face Subcutaneous Tissue and Fascia with Skin and Subcutaneous Tissue, Open Approach (06/28/2019)  Carotid Artery Endarterectomy (Right) (05/15/2017)  Extirpation of Matter from Right Common Carotid Artery, Open Approach (05/15/2017)  Supplement Right Common Carotid Artery with Synthetic Substitute, Open Approach (05/15/2017)  Catheter placement in coronary artery(s) for coronary angiography, including intraprocedural injection(s) for coronary angiography, imaging supervision and interpretation;  with left heart catheterization including intraprocedural injection(s) for left kinga (03/01/2017)  Fluoroscopy of Left Heart using Low Osmolar Contrast (03/01/2017)  Fluoroscopy of Multiple Coronary Arteries using Low Osmolar Contrast (03/01/2017)  Measurement of Cardiac Sampling and Pressure, Left Heart, Percutaneous Approach (03/01/2017)  Appendectomy  Excision of ganglion of hand  ORIF - Open reduction of fracture of ankle with internal fixation  orif r wrist  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   Medications  Inpatient  aspirin, 81 mg= 1 tab(s), Oral, Daily  doxycycline 100 mg intravenous injection  DuoNeb, 3 mL, NEB, q4hr, PRN  ferrous sulfate 325 mg oral tablet, 325 mg= 1 tab(s), Oral, Every other day  Lipitor 20 mg oral tablet, 20 mg= 1 tab(s), Oral, Daily  metoprolol succinate 50 mg oral tablet extended release, 50 mg= 1 tab(s), Oral, Daily  Multiple Vitamins oral tablet, 1 tab(s), Oral, Daily  Protonix 40 mg Vial (IV Push), 40 mg= 1 EA, IV Slow, BID  Rocephin 2 g injection  Sodium Chloride 0.9% 1000mL 1,000 mL, 1000 mL, IV  Tylenol, 650 mg= 2 tab(s), Oral, q8hr, PRN  Xarelto, 20 mg= 2 tab(s), Oral, qPM  Zofran, 4 mg= 2 mL, IV Push, q4hr, PRN  Home  aspirin 81 mg oral Delayed Release (EC) tablet, 81 mg= 1 tab(s), Oral, Daily, 3 refills  Lipitor 20 mg oral tablet, 20 mg= 1 tab(s), Oral, Daily, 3 refills  metoprolol succinate 50 mg oral tablet extended release, 50 mg= 1 tab(s), Oral, Daily, 3 refills  multivitamin with minerals (Adult Tab), 1 tab(s), Oral, Daily  Xarelto 20mg Tablet, 20 mg, Oral, With Supper, 11 refills  Allergies  No Known Allergies  Social History  Abuse/Neglect  No, 08/27/2021  Alcohol  Current, Beer, 1-2 times per week, 02/19/2021  Employment/School  Retired, 01/31/2017  Exercise  Exercise frequency: Daily. Exercise type: Walking., 11/05/2020  Home/Environment  Lives with Spouse. Living situation: Home/Independent. Alcohol abuse in household: No. Substance abuse in household: No. Smoker in  household: Yes. Injuries/Abuse/Neglect in household: No. Feels unsafe at home: No. Safe place to go: Yes. Agency(s)/Others notified: No. Family/Friends available for support: Yes. Concern for family members at home: No. Major illness in household: No. Financial concerns: No. TV/Computer concerns: No. Risks in environment: Pets/Animal exposure., 07/31/2018  Nutrition/Health  Regular, 11/05/2020  Other  Sexual  Sexually active: Yes. Sexual orientation: Straight or heterosexual. Gender Identity Identifies as male., 05/28/2019  Gender Identity Identifies as male., 05/13/2019  Spiritual/Cultural  Mormonism, 11/05/2020  Substance Use  Never, 11/29/2016  Tobacco  10 or more cigarettes (1/2 pack or more)/day in last 30 days, Cigarettes, N/A, 08/27/2021  Family History  Breast cancer: Mother.  Heart disease: Mother and Father.  Hypertension.: Mother.  Immunizations  Vaccine Date Status Comments   zoster vaccine, inactivated 06/21/2021 Given    COVID-19 mRNA, LNP-S, PF - Moderna 03/30/2021 Recorded    COVID-19 mRNA, LNP-S, PF - Moderna 03/02/2021 Recorded    pneumococcal 23-polyvalent vaccine 11/24/2020 Given    zoster vaccine, inactivated 11/24/2020 Given    influenza virus vaccine, inactivated 11/05/2020 Given    tetanus/diphtheria/pertussis, acel(Tdap) 11/05/2020 Given    influenza virus vaccine, inactivated 11/11/2019 Given    influenza virus vaccine, inactivated 11/08/2018 Given tolerated well   influenza virus vaccine, inactivated 11/30/2016 Given

## 2022-09-13 NOTE — HISTORICAL [facility] CERNER
This is a historical note converted from [employer]. Formatting and pictures may have been removed.  Please reference [employer]ner for original formatting and attached multimedia. Chief Complaint  left wrist pain  History of Present Illness  61 yo M presents today for one-time clinic appt. Will scheduled for PCP. Currently following cardiology clinic for HTN, Nonobstructive, CAD, a-fib, CAD, tobacco abuse. R carotid endarterectomy, 05/2017. Pt c/o fall yesterday and landed on his left wrist. Reports pain to area relieved by OTC Tylenol but is having difficulty moving, although improved since yesterday. Smokes ~ 0.5 ppd, somewhat ready to quit smoking; is trying to quit on his own.  Review of Systems  Constitutional: Negative.  Ear/Nose/Mouth/Throat: Negative.  Respiratory: No shortness of breath, No cough, No sputum production, No hemoptysis, No wheezing.  Cardiovascular: No chest pain, No palpitations, No peripheral edema, No syncope.  Breast: Negative.  Gastrointestinal: No nausea, No vomiting, No diarrhea, No constipation, No hematemesis Abdominal pain: All quadrants.  Genitourinary: Negative.  Hematology/Lymphatics: Negative.  Endocrine: Negative.  Immunologic: Negative.  Musculoskeletal: No back pain, No neck pain,?L wrist ?joint pain, No muscle pain, No decreased range of motion.  Integumentary: Negative.  Neurologic: Negative except as documented in history of present illness.  Physical Exam  Vitals & Measurements  T:?36.7? ?C (Oral)? HR:?93(Peripheral)? RR:?18? BP:?121/89?  HT:?185?cm? HT:?185?cm? WT:?86.7?kg? WT:?86.7?kg? BMI:?25.33?  General: Alert and oriented, No acute distress._  Eye: Pupils are equal, round and reactive to light, Extraocular movements are intact.  HENT: Normocephalic.  Neck: Supple, Non-tender, No carotid bruit, No lymphadenopathy.  Respiratory: Lungs are clear to auscultation, Respirations are non-labored, Breath sounds are equal, Symmetrical chest wall expansion.  Cardiovascular: Normal rate,  Regular rhythm, No murmur.  Gastrointestinal: Soft, Non-tender, Non-distended, Normal bowel sounds.  Musculoskeletal: Normal range of motion. Decreased ROM to left wrist; TTP to let wrist B. Unable to passively flex hand d/t pain.  Integumentary: Warm, Dry, Intact.  Neurologic: No focal deficits, Cranial Nerves II-XII are grossly intact.  Assessment/Plan  1.?Tobacco user  ? Enc tobacco cessation. Not ready for TCI at this point, want to try on his own  LD Lung scan  ?  2.?Left wrist pain  ?XR left wrist  Use wrist splint  Take OTC Tylenol PRN pre package instructions  Use Ice  Ordered:  XR Wrist Left 2 Views, Routine, *Est. 07/31/18 3:00:00 CDT, Fall, Fall yesterday, pain, decreased wrist mvment, None, Wheelchair, Patient Has IV?, Rad Type, Order for future visit, Left wrist pain, Not Scheduled, *Est. 07/31/18 3:00:00 CDT  ?  RTC to establish PCP in?3 mo   Problem List/Past Medical History  Ongoing  Afib  Atrial fibrillation and flutter  Benign essential HTN  Carotid stenosis  H/O: CVA  Tobacco user  Tobacco user  Tobacco user  Tobacco user  Historical  No qualifying data  Procedure/Surgical History  Carotid Artery Endarterectomy (Right) (05/15/2017)  Extirpation of Matter from Right Common Carotid Artery, Open Approach (05/15/2017)  Supplement Right Common Carotid Artery with Synthetic Substitute, Open Approach (05/15/2017)  Catheter placement in coronary artery(s) for coronary angiography, including intraprocedural injection(s) for coronary angiography, imaging supervision and interpretation; with left heart catheterization including intraprocedural injection(s) for left kinga (03/01/2017)  Fluoroscopy of Left Heart using Low Osmolar Contrast (03/01/2017)  Fluoroscopy of Multiple Coronary Arteries using Low Osmolar Contrast (03/01/2017)  Measurement of Cardiac Sampling and Pressure, Left Heart, Percutaneous Approach (03/01/2017)  Appendectomy  Excision of ganglion of hand  left ankle fracture  ORIF - Open reduction  of fracture of ankle with internal fixation  orif r wrist  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   Medications  aspirin 81 mg oral Delayed Release (EC) tablet, 81 mg= 1 tab(s), Oral, Daily, 6 refills  buffered lidocaine 2% - 0.5 ml syringe, 10 mg= 0.5 mL, Subcutaneous, As Directed  Lipitor 20 mg oral tablet, 20 mg= 1 tab(s), Oral, Daily, 11 refills  [id number] 1,000 mL, 1000 mL, IV  metoprolol succinate 50 mg oral tablet extended release, 50 mg= 1 tab(s), Oral, Daily, 6 refills  multivitamin with minerals (Adult Tab), 1 tab(s), Oral, Daily  Triple Antibiotic Ointment topical, 1 dose(s), TOP, Once  Xarelto 20mg Tablet, 20 mg, Oral, With Supper, 11 refills  Allergies  No Known Allergies  Social History  Alcohol  Current, Beer, 1-2 times per week, 5 drinks/episode average., 02/17/2017  Employment/School  Retired, 01/31/2017  Home/Environment  Lives with Spouse. Living situation: Home/Independent. Alcohol abuse in household: No. Substance abuse in household: No. Smoker in household: Yes. Injuries/Abuse/Neglect in household: No. Feels unsafe at home: No. Safe place to go: Yes. Agency(s)/Others notified: No. Family/Friends available for support: Yes. Concern for family members at home: No. Major illness in household: No. Financial concerns: No. TV/Computer concerns: No. Risks in environment: Pets/Animal exposure., 07/31/2018  Other  Substance Abuse  Never, 11/29/2016  Tobacco  Current every day smoker Use:. Cigarettes Type:. 10 per day., 07/31/2018  Family History  Breast cancer: Mother.  Heart disease: Mother and Father.  Hypertension.: Mother.  Immunizations  Vaccine Date Status   influenza virus vaccine, inactivated 11/30/2016 Given   Health Maintenance  Health Maintenance  ???Pending?(in the next year)  ??? ??OverDue  ??? ? ? ?Coronary Artery Disease Maintenance-Lipid Lowering Therapy due??and every?  ??? ??Due?  ??? ? ? ?Aspirin Therapy for CVD Prevention due??06/21/18??and every 1??year(s)  ??? ? ? ?Abdominal Aortic Aneurysm  Screening due??07/31/18??and every 100??year(s)  ??? ? ? ?Alcohol Misuse Screening due??07/31/18??and every 1??year(s)  ??? ? ? ?Colorectal Screening ([facility]) due??07/31/18??and every?  ??? ? ? ?Functional Assessment due??07/31/18??and every 1??year(s)  ??? ? ? ?Pneumococcal Vaccine due??07/31/18??and every 100??year(s)  ??? ? ? ?Tetanus Vaccine due??07/31/18??and every 10??year(s)  ??? ? ? ?Zoster Vaccine due??07/31/18??and every 100??year(s)  ??? ??Due In Future?  ??? ? ? ?Hypertension Management-BMP not due until??04/30/19??and every 1??year(s)  ??? ? ? ?Hypertension Management-Blood Pressure not due until??05/09/19??and every 1??year(s)  ??? ? ? ?Fall Risk Assessment not due until??05/09/19??and every 1??year(s)  ??? ? ? ?Obesity Screening not due until??05/09/19??and every 1??year(s)  ??? ? ? ?Smoking Cessation not due until??05/09/19??and every 1??year(s)  ???Satisfied?(in the past 1 year)  ??? ??Satisfied?  ??? ? ? ?Blood Pressure Screening on??05/09/18.??Satisfied by Ro Dior RN  ??? ? ? ?Body Mass Index Check on??05/09/18.??Satisfied by Ro Dior RN  ??? ? ? ?Coronary Artery Disease Maintenance-Lipid Lowering Therapy on??09/20/17.??Satisfied by Reji ESPINOZA-CJose  ??? ? ? ?Depression Screening on??07/31/18.??Satisfied by Magui Singh LPN  ??? ? ? ?Diabetes Screening on??04/30/18.??Satisfied by Lorene Hook  ??? ? ? ?Fall Risk Assessment on??05/09/18.??Satisfied by Ro Dior RN  ??? ? ? ?Hypertension Management-Blood Pressure on??05/09/18.??Satisfied by Ro Dior RN  ??? ? ? ?Lipid Screening on??09/19/18.??Satisfied by Yulissa Weber  ??? ? ? ?Obesity Screening on??05/09/18.??Satisfied by Ro Dior RN  ??? ? ? ?Smoking Cessation on??05/09/18.??Satisfied by Ro Dior RN  ?  ?

## 2022-09-13 NOTE — HISTORICAL [facility] CERNER
This is a historical note converted from Ceranu. Formatting and pictures may have been removed.  Please reference Ceranu for original formatting and attached multimedia. Chief Complaint  weak, dizzinnes, diaphoretic, hx of blood transfusions and colon polyps. Also reports possible broken rib on L side.  History of Present Illness  This is a 58-year-old male with a past medical history of hypertension, nonobstructive CAD, atrial fibrillation on Xarelto, CVA, carotid artery disease status post right CEA, and chronic tobacco use who presents to [facility] ED with weakness and fatigue. ?Wife states that patient has been feeling very weak over the last?4 to?5 weeks and states that patient had a GI bleed approximately 1 to 2 months ago with a similar presentation which required transfusion at that time. ?Patient does complain of exertional shortness of breath, increased weakness, and dark stools. ?Patient denies any chest pain,?palpitations, tachycardia, fevers, cough,?loss of consciousness, dizziness, syncope.  In the ED heart rate 86, respiratory rate 22, blood pressure 103/71. ?BNP is slightly elevated at 340.1. ?Lactic acid 2.4. ?PT 25, INR 2.3, PTT 47.6. ?H/H 8.7/26.8. ?Upon admission patient needs?sepsis criteria?with a heart rate of 103,?WBC?of 13,?and?possible source of infection in the lung.  ?   Past medical history:?As charted above  Past surgical history:?Appendectomy, right CEA, colon polyp removal  Family history:?Mother(breast cancer, hypertension, heart disease), father(heart disease)  Social history:?Half pack of cigarettes?per day for 40 years,?1-2 sixpacks of beer?per week,?denies illicit drug use  Review of Systems  Constitutional: Decreased appetite. No fevers, no chills, no?recent illness or sick contacts  Head:?No headache, no neck pain  Eyes: No blurry vision, no double vision  Ears: No hearing loss, no tinnitus  Nose/mouth: No rhinorrhea, no congestion, no sore throat  Cardiac: No chest pain, no  palpitations, no lower extremity edema, no orthopnea, no paroxysmal nocturnal dyspnea  Resp: Exertional shortness of breath?with associated?dyspnea,?no coughing, no hemoptysis  GI: No abdominal pain, no constipation, no diarrhea, no nausea, no emesis, no hematochezia or melena  Gu: No dysuria, no hematuria, no incontinence  Musculoskeletal: Muscle weakness. no joint pain. Ambulatory without assistance at baseline  Neurologic: No loss of sensation, no dizziness,?no syncope  Skin: No rash, no jaundice, no sores  Endocrine: No polyuria, no polydipsia, no heat/cold intolerance  Physical Exam  Vitals & Measurements  T:?36.7? ?C (Oral)? HR:?86(Peripheral)? HR:?83(Monitored)? RR:?22? BP:?103/71? SpO2:?98%? WT:?69.5?kg?  Gen: well-nourished, well-developed  male?in no acute distress  HEENT: Normocephalic, atraumatic.  Neck: No JVD or carotid bruits. No thyromegaly. No lymphadenopathy.  Heart: Irregular rhythm, no murmurs, gallops, clicks or rubs.  Lungs: Diffuse rales heard?bilaterally?right worse than left. Normal work of breathing. Chest rise symmetrical on inspiration.  Abd: Soft, non-tender, non-distended and without guarding. No organomegaly. No obvious masses. Bowel sounds present.  Extremities: Radial and pedal pulses 2+ bilaterally, no LE edema.  MSK: No obvious deformities. Moves all extremities purposefully.  Neuro: Responds well to commands.  Skin: Warm, dry and without rashes.  Assessment/Plan  Sepsis?secondary to?community-acquired pneumonia  Blood culture,?UA/culture?to rule out infectious causes of?fatigue?and weakness  Chest x-ray?is significant for new lower lung?interstitial opacities?that may be?mild pulmonary edema or pneumonia  Rocephin 2?and doxycycline 100?started empirically for possible pneumonia (day 1)  Echo ordered to rule out?new onset?heart failure?and because of?weakness and fatigue  As needed?DuoNeb?for significant wheezing  ?  Hypertension  Continue?metoprolol 50  Atorvastatin  20  ?  Atrial fibrillation  holding [id number]  ?  DVT Px:SCDs  GI Px:Zofran 4  IVF: NS @120cc  Diet: Low sodium  ABx: Rocephin, doxycycline  Dispo:?Patient will be admitted to remote telemetry for observation.? Need to follow-up?blood cultures, UA, urine culture, echo,?and repeat H&H?in the morning.  ?  ?  Resident addendum:  ?  Agree with above documentation, assessment, plan  ?  Patient essentially a 66-year-old male with PMH of nonobstructive CAD, hypertension, hyperlipidemia, atrial fibrillation on Xarelto, previous CVA in 2016, carotid disease s/p right CEA, tobacco/alcohol use, previous GI bleed requiring transfusion who presented for generalized weakness and fatigue and decreased appetite which has been slightly worsening over the past 5-6 weeks.? He also is endorsing occasional melena and shortness of breath on exertion which has been ongoing for roughly 1 week.? FOBT in the ER was positive.? Denies any other symptoms and states he is COVID-19 vaccinated x2.? Denies fevers, chills, headaches, syncope, lightheadedness, dizziness,?chest pain, palpitations,?cough,?nausea,?vomiting, abdominal pain, diarrhea, hematochezia,?numbness, tingling,?or urinary complaints.  ?  States he smokes?0.5-1 PPD?and drinks?up to 12 ounces of beer?per week.  ?  EGD from 8/12/2020 within normal limits.  Colonoscopy from 8/12/2020 with findings as below:  ?  One 13 mm sessile polyp in the ascending colon removed with snare cautery polypectomy on Eleview lift.  Two 4 mm sessile polyps in the ascending colon removed with cold snare polypectomy?  Three 3-6 mm sessile polyps in the transverse colon removed with cold snare polypectomy?  Three 3-4 mm sessile polyps in the descending and sigmoid colon removed with cold snare polypectomy.? Descending polyp was not retrieved.  One 6 mm sessile polyp in the rectum removed with snare cautery polypectomy  One 30-40 mm sessile polyp in the transverse colon occupying 25% of the circumference and  extending over two folds.? Endoscopic mucosal resection was performed with piecemeal snare cautery over Eleview lift.? All pieces were retrieved and place in a separate jar.? A tattoo was placed on the opposite wall of the polypectomy defect for future localization.  Small internal hemorrhoids.?  ?  Repeat colonoscopy on 2/19/2021 with normal examined colon.? Recommending repeat in 2 years.  ?  On exam: Well-developed, well-nourished, no acute distress, on room air, normocephalic, atraumatic, PERRLA, EOMI, MMM, no JVD, no HJR, irregular rhythm, normal rate, no MGR, scattered rhonchi heard bilaterally worse on right side, no obvious crackles or wheeze, abdomen soft nontender nondistended no guarding no masses normal bowel sounds, no edema, normal peripheral pulses, no FND, cranial nerves and strength grossly intact, no rashes.  ?  Medications (10) Active  Scheduled: (6)  aspirin 81 mg EC Tab ?81 mg 1 tab(s), Oral, Daily  atorvastatin 20 mg Tab ?20 mg 1 tab(s), Oral, Daily  cefTRIAXone ?1 gm 1 EA, IV Piggyback, q24hr  doxycycline ?100 mg 1 EA, IV Piggyback, BID  metoprolol succ. 50 mg XL Tab ?50 mg 1 tab(s), Oral, Daily  multivitamin Multiple Vitamins Tab UD ?1 tab(s), Oral, Daily  Continuous: (2)  sodium chloride 0.9% 1,000 mL ?1,000 mL, IV, 120 mL/hr  Sodium Chloride 0.9% intravenous solution 100 mL + pantoprazole ?Inj 80 mg ?100 mL, IV, 10 mL/hr  PRN: (2)  albuterol-ipratropium 3mg-0.5 mg/3 mL Sol ?3 mL, NEB, q4hr  ondansetron 2 mg/mL inj - 2mL ?4 mg 2 mL, IV Push, q4hr  ?  A/P  ?  Sepsis secondary to community-acquired pneumonia  SIRS 2/4+ on admission  Lactic acidosis, 2.4  Elevated BNP, 340  Leukocytosis, 13.4  Normocytic/macrocytic anemia  Melena  ?  -CXR with bilateral pneumonia versus pulmonary edema  -Pending blood culture x2, respiratory culture/Gram stain, UA/culture  -Hydrating with normal saline, pending repeat lactic acid  -Started Rocephin and doxycycline, duo nebs PRN for wheezing  -Echocardiogram  pending secondary to elevated BNP, previous echo from 2020 with EF of 50%  -Continue home aspirin 81, Lipitor 20, succinate 50  -Holding home Xarelto at this time, SCDs for prophylaxis  -Monitor H&H, received Protonix 80 in the ED, currently on continuous Protonix infusion, hemodynamically stable  -Pending anemia work-up, no transfusion at this time: H&H 8.7/26.8, continue to monitor  ?  Nino Schroeder MD   Problem List/Past Medical History  Ongoing  Actinic keratosis of right temple  Anemia  Atrial fibrillation  Basal cell carcinoma  Bulging lumbar disc  Carotid stenosis  Chronic back pain  Colon polyps  Essential hypertension  H/O: CVA  H/O: stroke  Hypercholesterolemia  Hyperlipidemia  Impacted cerumen  Internal hemorrhoid  Left wrist pain  Tobacco user  Historical  Positive fecal occult blood test  Medications  Inpatient  aspirin, 81 mg= 1 tab(s), Oral, Daily  doxycycline 100 mg intravenous injection  DuoNeb, 3 mL, NEB, q4hr, PRN  Lipitor 20 mg oral tablet, 20 mg= 1 tab(s), Oral, Daily  metoprolol succinate 50 mg oral tablet extended release, 50 mg= 1 tab(s), Oral, Daily  Multiple Vitamins oral tablet, 1 tab(s), Oral, Daily  Protonix 40 mg ORAL enteric coated tablet, 40 mg= 1 tab(s), Oral, Daily  Rocephin 2 g injection  Sodium Chloride 0.9% 1000mL 1,000 mL, 1000 mL, IV  Sodium Chloride 0.9% intravenous solution 100 mL + Pantoprazole additive for cont. infusion 80 mg  Zofran, 4 mg= 2 mL, IV Push, q4hr, PRN  Home  aspirin 81 mg oral Delayed Release (EC) tablet, 81 mg= 1 tab(s), Oral, Daily, 3 refills  Lipitor 20 mg oral tablet, 20 mg= 1 tab(s), Oral, Daily, 3 refills  metoprolol succinate 50 mg oral tablet extended release, 50 mg= 1 tab(s), Oral, Daily, 3 refills  multivitamin with minerals (Adult Tab), 1 tab(s), Oral, Daily  Xarelto 20mg Tablet, 20 mg, Oral, With Supper, 11 refills  Allergies  No Known Allergies  Family History  Breast cancer: Mother.  Heart disease: Mother and Father.  Hypertension.:  Mother.  Immunizations  Vaccine Date Status Comments   zoster vaccine, inactivated 06/21/2021 Given    COVID-19 mRNA, LNP-S, PF - Moderna 03/30/2021 Recorded    COVID-19 mRNA, LNP-S, PF - Moderna 03/02/2021 Recorded    pneumococcal 23-polyvalent vaccine 11/24/2020 Given    zoster vaccine, inactivated 11/24/2020 Given    influenza virus vaccine, inactivated 11/05/2020 Given    tetanus/diphtheria/pertussis, acel(Tdap) 11/05/2020 Given    influenza virus vaccine, inactivated 11/11/2019 Given    influenza virus vaccine, inactivated 11/08/2018 Given tolerated well   influenza virus vaccine, inactivated 11/30/2016 Given    Lab Results  Labs Last 24 Hours?  ?Chemistry? Hematology/Coagulation?   Sodium Lvl: 136 mmol/L (08/27/21 11:08:00) PT:?25 second(s)?High (08/27/21 10:50:00)   Potassium Lvl: 3.8 mmol/L (08/27/21 11:08:00) INR:?2.3?High (08/27/21 10:50:00)   Chloride: 102 mmol/L (08/27/21 11:08:00) PTT:?47.6 second(s)?High (08/27/21 10:50:00)   CO2: 24 mmol/L (08/27/21 11:08:00) WBC:?13.4 x10(3)/mcL?High (08/27/21 10:50:00)   Calcium Lvl: 9.5 mg/dL (08/27/21 11:08:00) RBC:?2.69 x10(6)/mcL?Low (08/27/21 10:50:00)   Glucose Lvl: 101 mg/dL (08/27/21 11:08:00) Hgb:?8.7 gm/dL?Low (08/27/21 10:50:00)   BUN: 10.8 mg/dL (08/27/21 11:08:00) Hct:?26.8 %?Low (08/27/21 10:50:00)   Creatinine:?0.68 mg/dL?Low (08/27/21 11:08:00) Platelet:?534 x10(3)/mcL?High (08/27/21 10:50:00)   eGFR-AA: >105 (08/27/21 11:08:00) MCV: 99.6 fL (08/27/21 10:50:00)   eGFR-NAMAN: >105 (08/27/21 11:08:00) MCH: 32.3 pg (08/27/21 10:50:00)   Bili Total: 0.5 mg/dL (08/27/21 11:08:00) MCHC: 32.5 gm/dL (08/27/21 10:50:00)   Bili Direct: 0.3 mg/dL (08/27/21 11:08:00) RDW: 12.8 % (08/27/21 10:50:00)   Bili Indirect: 0.2 mg/dL (08/27/21 11:08:00) MPV: 8.8 fL (08/27/21 10:50:00)   AST: 34 unit/L (08/27/21 11:08:00) Abs Neut:?10.48 x10(3)/mcL?High (08/27/21 10:50:00)   ALT: 30 unit/L (08/27/21 11:08:00) Neutro Auto: 78 % (08/27/21 10:50:00)   Alk Phos: 53 unit/L  (08/27/21 11:08:00) Lymph Auto: 10 % (08/27/21 10:50:00)   Total Protein: 6.7 gm/dL (08/27/21 11:08:00) Mono Auto: 11 % (08/27/21 10:50:00)   Albumin Lvl:?2.4 gm/dL?Low (08/27/21 11:08:00) Eos Auto: 0 % (08/27/21 10:50:00)   Globulin:?4.3 gm/dL?High (08/27/21 11:08:00) Abs Eos: 0 x10(3)/mcL (08/27/21 10:50:00)   A/G Ratio:?0.6 ratio?Low (08/27/21 11:08:00) Basophil Auto: 0 % (08/27/21 10:50:00)   BNP:?340.4 pg/mL?High (08/27/21 11:08:00) Abs Neutro:?10.48 x10(3)/mcL?High (08/27/21 10:50:00)   Lactic Acid Lvl:?2.4 mmol/L?Critical (08/27/21 11:08:00) Abs Lymph: 1.3 x10(3)/mcL (08/27/21 10:50:00)   Troponin-I: <0.010 (08/27/21 11:08:00) Abs Mono:?1.5 x10(3)/mcL?High (08/27/21 10:50:00)    Abs Baso: 0 x10(3)/mcL (08/27/21 10:50:00)    NRBC%: 0.0 (08/27/21 10:50:00)    IG%: 0 % (08/27/21 10:50:00)    IG#: 0.06 (08/27/21 10:50:00)   Diagnostic Results  Accession:?[id number]  Order:?XR Chest 1 View  Report Dt/Tm:?08/27/2021 11:38  Report:?  XR Chest 1 View  ?  REASON FOR STUDY: Dyspnea  ?  COMPARISON: Radiograph s 8/10/2020  ?  FINDINGS: Mild bilateral interstitial opacities in the lower lungs. No  pleural effusion or pneumothorax. Normal cardiac silhouette.?  ?  IMPRESSION: New lower lung interstitial opacities. These may be mild  pulmonary edema or pneumonia.  ?  ?

## 2022-09-13 NOTE — HISTORICAL [facility] CERNER
This is a historical note converted from Jennifer. Formatting and pictures may have been removed.  Please reference Ceranu for original formatting and attached multimedia. Subjective  Mr. Castro is a 66-year-old male with PMHx significant for HTN, A. fib on Xarelto, previous CVA, nonobstructive CAD as well as carotid artery disease s/p right-sided carotid endarterectomy who was most recently admitted on 9/16/2021 to [facility] internal medicine services for treatment of acute worsening shortness of breath, hypercalcemia of unknown origin.? Of note, patient was also recently admitted to [facility] internal medicine services from 8/28-9/1/21 at which time he was treated for symptomatic anemia requiring transfusion.? Labs on presentation from this admission were significant for an uncorrected calcium of 13.3 which corrected to approximately 14.9.? ABG on admission significant for pH 7.49, PCO2 42, PO2 72, bicarb 32.? Chest x-ray on admission significant for interval increase of bilateral pleural effusions, patient underwent thoracentesis.? CT angio of the chest on 9/17/2021 was concerning for findings consistent with metastatic disease with pulmonary nodules, mediastinal and right hilar lymphadenopathy, lytic bony lesions as well as possible hepatic lesions and mesenteric lymphadenopathy.? CT of the abdomen and pelvis on 9/21 also concerning for L2 lytic osseous lesion as well as other concerning findings.? In regards to hypercalcemia, patient was treated with IVF as well as calcitonin and zoledronic acid.? Calcium has been slowly decreasing, while findings concerning for hyponatremia and/or SIADH have resulted over the last several days.??In reviewing?previous labs?from different encounters, appears that over the past several months, patient has become progressively more hypercalcemic?with worsening electrolyte derangements as well as?hypoalbuminemia.  ?   [facility] nephrology services consulted for?assistance with electrolyte derangements  in the setting of possible?SIADH, possibly 2/2?malignancy.  ?   Overnight/interval: No acute events overnight, no acute complaints this a.m. Thoracentesis attempted yesterday by primary team.?Still awaiting MRI brain, cannot be obtained until pillcam passed;? CT guided biopsy by IR yesterday of erosive left rib lesion, awaiting pathology results  Objective  Vitals & Measurements  T:?36.8? ?C (Oral)? TMIN:?36.2? ?C (Axillary)? TMAX:?36.8? ?C (Oral)? HR:?100(Peripheral)? RR:?20? BP:?138/93? SpO2:?98%?  Physical Exam  General:?elderly appearing male in mild respiratory distress  Respiratory:?coarse breath sounds bilaterally, no respiratory distress on supplemental oxygen, conversing in full sentences this a.m.  Cardiovascular:?regular rate and rhythm without murmurs, gallops or rubs, no peripheral edema  Gastrointestinal:?soft, mild tenderness to palpation, non-distended? without masses to palpation  Musculoskeletal:?no obvious deformities, no LE peripheral edema  Integumentary: pale appearing skin, upper extremity ecchymosis  Neurologic: no signs of peripheral neurological deficit, patient answers questions appropriately  Lab Results  Labs Last 24 Hours?  ?Chemistry? Hematology/Coagulation?   Sodium Lvl:?133 mmol/L?Low (09/24/21 04:15:00) WBC:?18.6 x10(3)/mcL?High (09/24/21 04:15:00)   Potassium Lvl: 3.5 mmol/L (09/24/21 04:15:00) RBC:?3.29 x10(6)/mcL?Low (09/24/21 04:15:00)   Chloride: 98 mmol/L (09/24/21 04:15:00) Hgb:?9.5 gm/dL?Low (09/24/21 04:15:00)   CO2: 25 mmol/L (09/24/21 04:15:00) Hct:?30 %?Low (09/24/21 04:15:00)   Calcium Lvl:?8.7 mg/dL?Low (09/24/21 04:15:00) Platelet:?569 x10(3)/mcL?High (09/24/21 04:15:00)   Magnesium Lvl: 2 mg/dL (09/24/21 04:15:00) MCV: 91.2 fL (09/24/21 04:15:00)   Glucose Lvl:?153 mg/dL?High (09/24/21 04:15:00) MCH: 28.9 pg (09/24/21 04:15:00)   BUN: 14.3 mg/dL (09/24/21 04:15:00) MCHC: 31.7 gm/dL (09/24/21 04:15:00)   Creatinine:?0.71 mg/dL?Low (09/24/21 04:15:00) RDW:?15.3 %?High  (09/24/21 04:15:00)   eGFR-AA: >105 (09/24/21 04:15:00) MPV: 8.6 fL (09/24/21 04:15:00)   eGFR-NAMAN: >105 (09/24/21 04:15:00) Abs Neut:?17.32 x10(3)/mcL?High (09/24/21 04:15:00)   Bili Total: 0.7 mg/dL (09/24/21 04:15:00) Neutro Auto: 93 % (09/24/21 04:15:00)   Bili Direct: 0.5 mg/dL (09/24/21 04:15:00) Lymph Auto: 2 % (09/24/21 04:15:00)   Bili Indirect: 0.2 mg/dL (09/24/21 04:15:00) Mono Auto: 4 % (09/24/21 04:15:00)   AST:?52 unit/L?High (09/24/21 04:15:00) Basophil Auto: 0 % (09/24/21 04:15:00)   ALT: 21 unit/L (09/24/21 04:15:00) Abs Neutro:?17.32 x10(3)/mcL?High (09/24/21 04:15:00)   Alk Phos: 76 unit/L (09/24/21 04:15:00) Abs Lymph:?0.3 x10(3)/mcL?Low (09/24/21 04:15:00)   Total Protein: 6.4 gm/dL (09/24/21 04:15:00) Abs Mono: 0.8 x10(3)/mcL (09/24/21 04:15:00)   Albumin Lvl:?1.8 gm/dL?Low (09/24/21 04:15:00) Abs Baso: 0 x10(3)/mcL (09/24/21 04:15:00)   Globulin:?4.6 gm/dL?High (09/24/21 04:15:00) NRBC%: 0.0 (09/24/21 04:15:00)   A/G Ratio:?0.4 ratio?Low (09/24/21 04:15:00) IG%: 1 % (09/24/21 04:15:00)   Phosphorus:?1.7 mg/dL?Low (09/24/21 04:15:00) IG#: 0.11 (09/24/21 04:15:00)   Assessment/Plan  SIADH, 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   -SIADH?likely secondary to malignancy, with?low serum osmolality?and urine osmolality >100, uric acid low at 2.2  -Renal indices this a.m.: BUN/creatinine 14.3/0.71  -NA this a.m. 133, K3.5, CL 98, bicarb 25  -In regards to hypercalcemia?with appropriately suppressed PTH, patient has undergone treatment including IVF as well as calcitonin, zoledronic acid with good response  -Agree with further workup for malignancy in order to properly disposition patient and future treatment  -CT-guided biopsy of erosive left rib lesion performed yesterday, awaiting pathology results  ?  ?  -Recommend continued close electrolyte monitoring and repletion  -agree with salt tabs/sodium repletion as indicated, currently on  potassium phosphate-sodium phosphate replacement  -Nephrology services will sign off at this time, though?please feel free to call with any questions  ?   Thank you for this consultation?allowing us to participate in the care of this patient, staff addendum to follow  ?   Staff addendum:  I saw and evaluated the patient. I agree with the findings and the plan of care as documented in the resident?s note.? The correction of hypercalcemia likely unmasked with the SIADH. ?Hypercalcemia does cause nephrogenic diabetes insipidus thereby causing dehydration.??I do agree?with repletion of phosphorus with sodium phosphate 30 mmol..  Cheryl Daniel M.D.  ?

## 2022-09-13 NOTE — HISTORICAL [facility]
This is a historical note converted from Jennifer. Formatting and pictures may have been removed.  Please reference Jennifer for original formatting and attached multimedia. Admit and Discharge Dates  Admit Date: 09/17/2021  Discharge Date: 10/01/2021  Physicians  Attending Physician - Gerry SAENZ, Alycia BENJAMIN  Admitting Physician - Gerry SAENZ, Alycia BENJAMIN  Consulting Physician - Renny SAENZ, Jose BENJAMIN  Consulting Physician - Jacob, Simba KENDALL  Consulting Physician - Isis SAENZ, Jason WEBBER  Consulting Physician - Yasir SAENZ, Garo KENDALL  Primary Care Physician - Hammad ESPINOZA-FAIZA, Brenda SHAY  Discharge Diagnosis  1.? Squamous cell carcinoma with unknown primary.  2.? A. fib.  3.? Anemia.  4.? Recurrent pleural effusion secondary to?malignancy.  5.Severe hypercalcemia, resolved.  6.? HTN.  7.? 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  8.? Malnourishment.  9.? Constipation.  10.? Physical debilitation  Surgical Procedures  09/20/2021 - [id number] - M2A Capsule Endoscopy  Immunizations  09/18/2021 - tuberculin purified protein derivative?  09/19/2021 - influenza virus vaccine, inactivated?  Admission Information  66-year-old male who presented to [facility] ED with melenic stools on 8/27/2021 and discharged on 9/1/21 s/p blood transfusion with 2 units PRBCs and treated for CAP.??  ?   Patient is present with his wife?at bedside today. ?Patient states that he?has?been feeling weak, and tired with shortness of breath?and mentions right-sided chest pain?since Monday.? Patient states that pain worsens with movement and?heavy lifting.??He continues to have cough, however, it is nonproductive. He spoke to his?primary care provider,?Brenda Cueva,?NP?and explained to her how he was feeling.?Ms. Cueva?looked at his labs and realized his RBC counts and H/H?were low and?told and?him to come back to the ED to the?evaluated. Patient is taking all of his medications as prescribed.? They are awaiting pill endoscopy per GI scheduled, 9/21/21. Covid negative.  ?   PMHx: AFIB,  Hx of CVA s/p rt. CEA (2017), CAD, Anemia, HLD, GERD  PSHx: Rt. CEA 2017, appendectomy  FamHx: Mom: Breast Cancer, HTN, Heart disease. Father: HTN, Heart Disease  SocHx: 3 cigarettes/day, no Etoh, No illicit drugs. Retired and lives with wife.  Allergies: NKDA  Medications: Xarelto?20?mg,?aspirin 81?mg,?atorvastatin 20?mg,?Toprol succinate 50?mg,?Flexeril,?Protonix?40?mg  Hospital Course  Patient on admission and 9/16 got CT angio chest which revealed more than 1 cm pulmonary nodules x2 associated with mediastinal lymphadenopathy, lytic bone lesions and possible hepatic lesions further not CT abdomen showing multiple mets in the abdomen, eventually got IR guided biopsy of left sixth rib lesion later pathology revealing for poorly differentiated squamous cell carcinoma with unknown primary.? Initially there was a concern for head and neck cancer and further obtain CT?soft tissue neck with contrast which did not show?any obvious mass, evaluated by ENT?got flexible laryngoscopy?which was?unrevealing?for any obvious head and neck tumor. ?Hence case was further discussed with oncology staff Dr. Ernst who was concerned for metastatic lung cancer, per his further recommendations send out PD-L1 and NSG panel molecular staining on biopsy.? Case was discussed with pulmonology staff, oncology staff and in agreement with current plan.  During the hospital course patient also had pleural effusions had a thoracentesis on 9/17 with 1 L output, further fluid analysis was transudative with no evidence of malignant cells later had a chest tube placed on 9/27 with output of 1.1 L and that fluid analysis was exudative and further work-up is still pending.  For his symptomatic anemia received the PillCam endoscopy awaiting on the read  Also noted to have severe hypercalcemia likely secondary to malignancy received zoledronic acid and calcitonin with improvement in his calcium.  Patient is a very complicated hospital course due to his  terminal illness.? But during the hospital course patient did work with PT, OT and was able to regain a little bit of his functional capacity and was hemodynamically and clinically stable home to be discharged.? Further plans to follow-up with oncology, [facility] post wards clinic.  Significant Findings  Pathology report of left sixth rib osteolytic lesion biopsy:?Poorly differentiated squamous cell carcinoma with unknown primary.  Time Spent on discharge  More than 1 hour spent on discharge  Objective  Vitals & Measurements  T:?37.0? ?C (Oral)? TMIN:?36.6? ?C (Oral)? TMAX:?37.3? ?C (Oral)? HR:?67(Monitored)? RR:?20? BP:?110/71? SpO2:?93%?  Physical Exam  Gen: well-nourished, well-developed? in no acute distress  HEENT: Normocephalic, atraumatic.  Neck: No JVD or carotid bruits. No thyromegaly. No lymphadenopathy.  Heart: RRR, no murmurs, gallops, clicks or rubs.  Lungs: CTAB without rales, wheezes or rhonchi. Normal work of breathing. Chest rise symmetrical on inspiration.  Abd: Soft, non-tender, non-distended and without guarding. No organomegaly. No obvious masses. Bowel sounds present.  Extremities: Radial and pedal pulses 2+ bilaterally, no LE edema.  MSK: No obvious deformities. Moves all extremities purposefully.  Neuro: Responds well to commands.  Skin: Warm, dry and without rashes.  Patient Discharge Condition  Patient is clinically and hemodynamically stable on discharge  Discharge Disposition  Discharge to home.  To follow-up in [facility]?IM post wards clinic.  Referral sent to?[facility] oncology clinic.  During hospital course patient work with PT,?with O2 saturations more than 90% on room air?and did not qualify for home oxygen   Discharge Medication Reconciliation  Prescribed  acetaminophen-HYDROcodone (acetaminophen-hydrocodone 325 mg-5 mg oral tablet)?1 tab(s), Oral, q6hr, PRN pain, severe  docusate (docusate sodium 100 mg oral capsule)?100 mg, Oral, BID  furosemide (Lasix 20 mg oral tablet)?20 mg, Oral,  Daily  gabapentin (gabapentin 300 mg oral capsule)?300 mg, Oral, At Bedtime  lidocaine topical (lidocaine 5% topical film)?1 patch(es), TD, Daily  mirtazapine (mirtazapine 30 mg oral tablet)?15 mg, Oral, Once a day (at bedtime)  polyethylene glycol 3350 (MiraLax oral powder for reconstitution)?17 gm, Oral, Daily  tamsulosin (Flomax 0.4 mg oral capsule)?0.4 mg, Oral, Daily  Continue  aspirin (aspirin 81 mg oral Delayed Release (EC) tablet)?81 mg, Oral, Daily  atorvastatin (Lipitor 20 mg oral tablet)?20 mg, Oral, Daily  ferrous sulfate (ferrous sulfate 325 mg oral tablet)?325 mg, Oral, Every other day  metoprolol (metoprolol succinate 50 mg oral tablet extended release)?50 mg, Oral, Daily  pantoprazole (Protonix 40 mg ORAL enteric coated tablet)?40 mg, Oral, BID  rivaroxaban (Xarelto 20mg Tablet)?20 mg, Oral, With Supper  Discontinue  multivitamin with minerals (multivitamin with minerals (Adult Tab))?1 tab(s), Oral, Daily  Education and Orders Provided  Atrial Fibrillation, Easy-to-Read  Protein-Energy Malnutrition  Anemia  Discharge - 10/01/21 15:44:00 CDT, Home, After getting meds to bed pls?  Follow up  [facility]  ????Office will call w/follow up appointment  Car Seat Challenge  No Qualifying Data

## 2022-09-13 NOTE — HISTORICAL [facility]
This is a historical note converted from Jennifer. Formatting and pictures may have been removed.  Please reference Jennifer for original formatting and attached multimedia. Chief Complaint  right carotid incision, c/o throat and jaw pain, cough  History of Present Illness  pod 3 from [facility]  here today with productive cough - green sputum  denies fever/chills  Review of Systems  Denies fever, chills  No weight loss  Denies chest pain, shortness of breath  Denies nausea/vomiting/hematemesis  No change in bowel habits, denies diarrhea/constipation/hematochezia/melena  ?  ?  Physical Exam  Vitals & Measurements  T:?36.5? ?C ?(Oral)? HR:?80?(Peripheral)? RR:?22? BP:?98/77? HT:?186?cm? HT:?186?cm? WT:?93?kg? WT:?93?kg? BMI:?26.88?  NAD  S1S2  CTAB  ABD S/NT/ND  ?  neck with some post-op swelling and tenderness, soft, no hematoma,?incision clean  Assessment/Plan  Cough  will get CXR today  course and type of ABX depending on CXR results  ?  Ordered:  XR Chest 1 View, Routine, 05/18/17 13:32:00 CDT, Cough, None, Wheelchair, Patient Has IV?, Rad Type, Cough, 05/18/17 13:32:00 CDT  ?   Problem List/Past Medical History  Afib  Atrial fibrillation and flutter  Benign essential HTN  Carotid stenosis  H/O: CVA  Tobacco user  Tobacco user  Tobacco user  Historical  No historical problems  Procedure/Surgical History  Carotid Artery Endarterectomy (Right) (05/15/2017), Catheter placement in coronary artery(s) for coronary angiography, including intraprocedural injection(s) for coronary angiography, imaging supervision and interpretation; with left heart catheterization including intraprocedural injection(s) for left kinga (03/01/2017), Fluoroscopy of Left Heart using Low Osmolar Contrast (03/01/2017), Fluoroscopy of Multiple Coronary Arteries using Low Osmolar Contrast (03/01/2017), Measurement of Cardiac Sampling and Pressure, Left Heart, Percutaneous Approach (03/01/2017), Appendectomy, Excision of ganglion of hand, ORIF - Open reduction  of fracture of ankle with internal fixation, orif r wrist, 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.  Medications  acetaminophen-hydrocodone 325 mg-5 mg oral tablet, 1 tab(s), Oral, q4hr, PRN  aspirin 81 mg oral Delayed Release (EC) tablet, 81 mg, 1 tab(s), Oral, Daily, 4 refills  buffered lidocaine 2% - 0.5 ml syringe, 10 mg, 0.5 mL, Subcutaneous, As Directed  Colace 50 mg oral capsule, 50 mg, 1 cap(s), Oral, BID, PRN  Coricidin, 1 tablet, Oral, q16hr  Lipitor 20 mg oral tablet, 20 mg, 1 tab(s), Oral, Daily, 4 refills  Lovenox 100 mg/mL subcutaneous solution, 100 mg, 1 mL, Subcutaneous, q12hr,? ?Not taking  [id number] 1,000 mL, 1000 mL, IV  metoprolol succinate 25 mg oral tablet, extended release, 25 mg, 1 tab(s), Oral, Daily, 4 refills  Triple Antibiotic Ointment topical, 1 dose(s), TOP, Once  Xarelto 20mg Tablet, 20 mg, Oral, With Supper, 4 refills  Allergies  No Known Allergies  Social History  Alcohol  Current, Beer, 1-2 times per week, 5 drinks/episode average.  Employment/School  Retired  Substance Abuse  Never  Tobacco  Current every day smoker, Cigarettes, 10 per day. 50 year(s).  Current every day smoker, Cigarettes, 10 per day.